# Patient Record
Sex: FEMALE | Race: WHITE | NOT HISPANIC OR LATINO | Employment: FULL TIME | URBAN - METROPOLITAN AREA
[De-identification: names, ages, dates, MRNs, and addresses within clinical notes are randomized per-mention and may not be internally consistent; named-entity substitution may affect disease eponyms.]

---

## 2017-04-26 ENCOUNTER — OFFICE VISIT (OUTPATIENT)
Dept: FAMILY MEDICINE | Facility: CLINIC | Age: 52
End: 2017-04-26
Payer: COMMERCIAL

## 2017-04-26 VITALS
OXYGEN SATURATION: 97 % | HEIGHT: 68 IN | HEART RATE: 90 BPM | WEIGHT: 178.75 LBS | DIASTOLIC BLOOD PRESSURE: 84 MMHG | RESPIRATION RATE: 18 BRPM | BODY MASS INDEX: 27.09 KG/M2 | SYSTOLIC BLOOD PRESSURE: 124 MMHG | TEMPERATURE: 97.8 F

## 2017-04-26 DIAGNOSIS — Z12.4 PAP SMEAR FOR CERVICAL CANCER SCREENING: ICD-10-CM

## 2017-04-26 DIAGNOSIS — Z13.6 CARDIOVASCULAR SCREENING; LDL GOAL LESS THAN 160: ICD-10-CM

## 2017-04-26 DIAGNOSIS — Z72.0 TOBACCO ABUSE: ICD-10-CM

## 2017-04-26 DIAGNOSIS — Z11.4 ENCOUNTER FOR SCREENING FOR HIV: ICD-10-CM

## 2017-04-26 DIAGNOSIS — Z12.39 SCREENING FOR MALIGNANT NEOPLASM OF BREAST: ICD-10-CM

## 2017-04-26 DIAGNOSIS — K59.00 CONSTIPATION, UNSPECIFIED CONSTIPATION TYPE: ICD-10-CM

## 2017-04-26 DIAGNOSIS — N95.1 MENOPAUSAL SYNDROME (HOT FLASHES): ICD-10-CM

## 2017-04-26 DIAGNOSIS — Z00.00 ENCOUNTER FOR ROUTINE ADULT HEALTH EXAMINATION WITHOUT ABNORMAL FINDINGS: Primary | ICD-10-CM

## 2017-04-26 DIAGNOSIS — Z12.11 SPECIAL SCREENING FOR MALIGNANT NEOPLASMS, COLON: ICD-10-CM

## 2017-04-26 DIAGNOSIS — K21.9 GASTROESOPHAGEAL REFLUX DISEASE, ESOPHAGITIS PRESENCE NOT SPECIFIED: ICD-10-CM

## 2017-04-26 DIAGNOSIS — Z23 ENCOUNTER FOR IMMUNIZATION: ICD-10-CM

## 2017-04-26 LAB
CHOLEST SERPL-MCNC: 234 MG/DL
HDLC SERPL-MCNC: 55 MG/DL
LDLC SERPL CALC-MCNC: 134 MG/DL
NONHDLC SERPL-MCNC: 179 MG/DL
TRIGL SERPL-MCNC: 227 MG/DL
TSH SERPL DL<=0.005 MIU/L-ACNC: 1.89 MU/L (ref 0.4–4)

## 2017-04-26 PROCEDURE — 86803 HEPATITIS C AB TEST: CPT | Performed by: FAMILY MEDICINE

## 2017-04-26 PROCEDURE — 87389 HIV-1 AG W/HIV-1&-2 AB AG IA: CPT | Performed by: FAMILY MEDICINE

## 2017-04-26 PROCEDURE — G0145 SCR C/V CYTO,THINLAYER,RESCR: HCPCS | Performed by: FAMILY MEDICINE

## 2017-04-26 PROCEDURE — 36415 COLL VENOUS BLD VENIPUNCTURE: CPT | Performed by: FAMILY MEDICINE

## 2017-04-26 PROCEDURE — 87624 HPV HI-RISK TYP POOLED RSLT: CPT | Performed by: FAMILY MEDICINE

## 2017-04-26 PROCEDURE — 90471 IMMUNIZATION ADMIN: CPT | Performed by: FAMILY MEDICINE

## 2017-04-26 PROCEDURE — 80061 LIPID PANEL: CPT | Performed by: FAMILY MEDICINE

## 2017-04-26 PROCEDURE — 84443 ASSAY THYROID STIM HORMONE: CPT | Performed by: FAMILY MEDICINE

## 2017-04-26 PROCEDURE — 90715 TDAP VACCINE 7 YRS/> IM: CPT | Performed by: FAMILY MEDICINE

## 2017-04-26 PROCEDURE — 99396 PREV VISIT EST AGE 40-64: CPT | Mod: 25 | Performed by: FAMILY MEDICINE

## 2017-04-26 ASSESSMENT — PAIN SCALES - GENERAL: PAINLEVEL: NO PAIN (0)

## 2017-04-26 NOTE — LETTER
May 4, 2017    Richmond Montanez  [id number] [address] [place] [id number]    Dear Richmond,  We are happy to inform you that your PAP smear result from 4/26/17 is normal.  We are now able to do a follow up test on PAP smears. The DNA test is for HPV (Human Papilloma Virus). Cervical cancer is closely linked with certain types of HPV. Your result showed no evidence of high risk HPV.  Therefore we recommend you return in 5 years for your next pap smear and HPV test.  You will still need to return to the clinic every year for an annual exam and other preventive tests.  Please contact the clinic at [phone] with any questions.  Sincerely,    Fernando Fonseca MD/esau

## 2017-04-26 NOTE — NURSING NOTE
"Chief Complaint   Patient presents with     Physical       Initial /84  Pulse 90  Temp 97.8  F (36.6  C) (Temporal)  Resp 18  Ht 5' 7.7\" (1.72 m)  Wt 178 lb 12 oz (81.1 kg)  LMP 05/12/2014 (Approximate)  SpO2 97%  BMI 27.42 kg/m2 Estimated body mass index is 27.42 kg/(m^2) as calculated from the following:    Height as of this encounter: 5' 7.7\" (1.72 m).    Weight as of this encounter: 178 lb 12 oz (81.1 kg).  Medication Reconciliation: complete     Ana M Braun CMA      "

## 2017-04-26 NOTE — PROGRESS NOTES
SUBJECTIVE:     CC: Richmond Montanez is an 51 year old woman who presents for preventive health visit.     Healthy Habits:    Do you get at least three servings of calcium containing foods daily (dairy, green leafy vegetables, etc.)? 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 no    Do you see a dentist twice per year? no    Do you have sleep apnea, excessive snoring or daytime drowsiness?no    Today's PHQ-2 Score:   PHQ-2 ( 1999 [employer]) 4/26/2017   Q1: Little interest or pleasure in doing things 1   Q2: Feeling down, depressed or hopeless 1   PHQ-2 Score 2     Abuse: Current or Past(Physical, Sexual or Emotional)- No  Do you feel safe in your environment - Yes    Social History   Substance Use Topics     Smoking status: Current Every Day Smoker     Packs/day: 1.00     Years: 15.00     Smokeless tobacco: Not on file     Alcohol use No      Comment: occasional     The patient does not drink >3 drinks per day nor >7 drinks per week.    No results for input(s): CHOL, HDL, LDL, TRIG, CHOLHDLRATIO, NHDL in the last 41409 hours.    Reviewed orders with patient.  Reviewed health maintenance and updated orders accordingly - Yes    Mammo Decision Support:  Patient over age 50, mutual decision to screen reflected in health maintenance.    Pertinent mammograms are reviewed under the imaging tab.  History of abnormal Pap smear:   NO - age 30-65 PAP every 5 years with negative HPV co-testing recommended  Last 3 Pap Results:   PAP (no units)   Date Value   09/04/2007 OTHER-NIL EM>40     Reviewed and updated as needed this visit by clinical staff  Tobacco  Allergies  Meds       Reviewed and updated as needed this visit by Provider        Past Medical History:   Diagnosis Date     Female infertility of unspecified origin      "  Past Surgical History:   Procedure Laterality Date      REPAIR OF NASAL SEPTUM  04/22/08    Septoplasty. Resect inferior turbinates, Partial uvulectomy.     ROS:  C: NEGATIVE for fever, chills, change in weight. POSITIVE for hotflashes.  I: NEGATIVE for worrisome rashes, moles or lesions  E: NEGATIVE for vision changes or irritation  ENT: NEGATIVE for ear, mouth and throat problems  R: NEGATIVE for significant cough or SOB  BREAST: POSITIVE for bilateral breast tenderness  CV: NEGATIVE for chest pain, palpitations or peripheral edema  GI: NEGATIVE for nausea or abdominal pain. POSITIVE for occasional heartburn/reflux and constipation with hard stools 1x/week.  : NEGATIVE for unusual urinary or vaginal symptoms. No vaginal bleeding. POSITIVE for vaginal dryness, occasional stress urinary incontinence.  M: NEGATIVE for significant arthralgias or myalgia  N: NEGATIVE for weakness, dizziness or paresthesias  P: NEGATIVE for changes in mood or affect     Problem list, Medication list, Allergies, and Medical/Social/Surgical histories reviewed in [facility] and updated as appropriate.  Patient Active Problem List    Diagnosis Date Noted     Deviated nasal septum 04/17/2008     Dyspnea and respiratory abnormality 04/17/2008     Problem list name updated by automated process. Provider to review       Personal history of tobacco use, presenting hazards to health 04/15/2002     Female infertility      Problem list name updated by automated process. Provider to review       OBJECTIVE:     /84  Pulse 90  Temp 97.8  F (36.6  C) (Temporal)  Resp 18  Ht 5' 7.7\" (1.72 m)  Wt 178 lb 12 oz (81.1 kg)  LMP 05/12/2014 (Approximate)  SpO2 97%  BMI 27.42 kg/m2  EXAM:  GENERAL APPEARANCE: Well-appearing female, slightly overweight, comfortable, alert and no distress  EYES: Eyes grossly normal to inspection, PERRL and conjunctivae and sclerae normal. Fundi benign.  HENT: Ear canals and TM's normal, nose and mouth without ulcers " or lesions, oropharynx clear and oral mucous membranes moist  NECK: No adenopathy, no asymmetry, masses, or scars and thyroid normal to palpation  RESP: Lungs clear to auscultation - no crackles or wheezes  BREAST: Normal without masses, tenderness or nipple discharge and no palpable axillary masses or adenopathy  CV: Regular rate and rhythm, normal S1 S2, no S3 or S4, no murmur, click or rub, no peripheral edema   ABDOMEN: Soft, nontender, no hepatosplenomegaly, no masses    (female): Normal female external genitalia, normal urethral meatus, slight vaginal mucosal atrophy noted, normal cervix without masses or abnormal discharge  MS: No musculoskeletal defects are noted   SKIN: No suspicious lesions or rashes  NEURO: Normal strength and tone, sensory exam grossly normal, mentation intact and speech normal  PSYCH: Mentation appears normal and affect normal/bright    Data:  Labs pending    ASSESSMENT/PLAdN:     (Z00.00) Encounter for routine adult health examination without abnormal findings  (primary encounter diagnosis)  Comment: Richmond Montanez is a 51 year old female presenting for routine physical. Patient has had limited medical contact with office visit in 2009. She has been doing well other than her menopausal symptoms and constipation. She also expresses desire to quit smoking today. On exam, patient vital signs are normal with slight prehypertensive reading. Patient was well-appearing with normal physical exam.  Plan: Pap imaged thin layer screen with HPV -         recommended age 30 - 65 years (select HPV order        below), HPV High Risk Types DNA Cervical, Lipid        Profile with reflex to direct LDL, Hepatitis C         Antibody  - See below for problem-specific plan    (Z12.4) Pap smear for cervical cancer screening  Comment: Patient due for routine pap smear/cervical cancer screen. No history of abnormal paps.  Plan:  - Pap and HPV co-test pending, rescreen in 5 years if negative    (Z12.11)  Special screening for malignant neoplasms, colon  Comment: Patient due for routine colonoscopy.  Plan: GASTROENTEROLOGY ADULT REF PROCEDURE ONLY,         CANCELED: GASTROENTEROLOGY ADULT REF PROCEDURE         ONLY          (Z12.39) Screening for malignant neoplasm of breast  Comment: Patient reported some bilateral breast tenderness but breast exam was benign. Due for routine screening mammogram.  Plan: *MA Screening Digital Bilateral    (K59.00) Constipation, unspecified constipation type  Comment: Richmond reports constipation with hard stools only 1x/week requiring straining. This has been going on for several years at least. She does have mother with history of hypothyroidism. Will test TSH to evaluate for hypothyroidism, also discussed dietary changes to help normalize bowel movements and discussed use of OTC Miralax to help soften stools.  Plan: TSH with free T4 reflex  - TSH pending  - Recommend use of Miralax 1 capful BID for 1 week, adjusting as necessary to achieve 1 soft stool every 1-2 days    (Z13.6) CARDIOVASCULAR SCREENING; LDL GOAL LESS THAN 160  Comment: Patient due for routine lipid screen.  Plan:   - Lipids pending  - Will assess patient's ASCVD risk factor pending lipid results    (Z11.4) Encounter for screening for HIV  Comment: One-time screen for HIV.  Plan: HIV Antigen Antibody Combo    (Z72.0) Tobacco abuse  Comment: Richmond expressed interest in tobacco cessation today. We discussed options of varenicline, bupropion, nicotine replacement options and counseling/quitplan resources. Patient was interested in trying varenicline. Encouraged her to also utilize the quitplan resources as combination medication and counseling significantly increases quit success rates.  Plan: varenicline (CHANTIX STARTING MONTH PAK) 0.5 MG        X 11 & 1 MG X 42 tablet  - Set quit date. Initiate varenicline 2 weeks prior to quit date and take per package instructions. 2 weeks following medication initiation, encouraged  patient to get rid of all cigarettes and ashtrays.  - Quitplan resource pamphlet provided to patient so that she may take advantage of their phone counseling and texting options if she desires.    (Z23) Encounter for immunization  Comment: Patient due for TDAP booster.  Plan: TDAP VACCINE (ADACEL), CANCELED: TDAP VACCINE         (BOOSTRIX)  - TDAP given today  - Given smoking history, patient is also a candidate for the Pneumovax vaccine    (K21.9) Gastroesophageal reflux disease, esophagitis presence not specified  Comment: Richmond reports excessive TUMS use at least daily and persistent symptoms of reflux and occasional heartburn. Suspect symptoms due to GERD. Low suspicion for PUD given low risk with limited ibuprofen use, though patient's smoking would put her at increased risk for ulcer development.   Plan: ranitidine (ZANTAC) 300 MG tablet  - Trial of ranitidine 300 mg daily for 6-8 weeks, if symptoms not improved, patient should undergo EGD for further evaluation    (N95.1) Menopausal syndrome (hot flashes)  Comment: LMP of 8/11/15. Patient reports very bothersome hotflashes both during the day and nightly.Also with noticeable vaginal dryness and low libido. She is interested in hormone replacement therapy. Discussed that estrogen replacement can be used safely for the first 10 years post-menopause though often only is needed for the first 2-3 years to control hot flash symptoms.   Plan: estrogen, conjugated,-medroxyPROGESTERone         (PREMPRO) 0.3-1.5 MG per tablet  - Take Prempro daily as prescribed, recheck symptoms in 1 year  - Will aim to wean off in 2-3 years and if vaginal dryness remains bothersome can consider estrogen cream in the future    COUNSELING:   Reviewed preventive health counseling, as reflected in patient instructions  Special attention given to:        Regular exercise       Healthy diet/nutrition       Vision screening       Vaccinated for: TDAP       Colon cancer screening        "Consider Hep C screening for patients born between 1945 and 1965       HIV screeninx in teen years, 1x in adult years, and at intervals if high risk       ([place])menopause management       Consider lung cancer screening for ages 55-80 years and 30 pack-year smoking history     BP Screening:   Last 3 BP Readings:    BP Readings from Last 3 Encounters:   17 124/84   14 120/78   09 122/72     The following was recommended to the patient:  Re-screen BP within a year and recommended lifestyle modifications     reports that she has been smoking.  She has a 15.00 pack-year smoking history. She does not have any smokeless tobacco history on file.  Tobacco Cessation Action Plan: Pharmacotherapies : Chantix  and Quitplan Pamphlet given  Estimated body mass index is 27.42 kg/(m^2) as calculated from the following:    Height as of this encounter: 5' 7.7\" (1.72 m).    Weight as of this encounter: 178 lb 12 oz (81.1 kg).   Weight management plan: Discussed healthy diet and exercise guidelines and patient will follow up in 12 months in clinic to re-evaluate.    Counseling Resources:  ATP IV Guidelines  Pooled Cohorts Equation Calculator  Breast Cancer Risk Calculator  FRAX Risk Assessment  ICSI Preventive Guidelines  Dietary Guidelines for Americans, 2010  USDA's MyPlate  ASA Prophylaxis  Lung CA Screening    Patient was seen and examined by myself under Dr. Fonseca's supervision.  The note was then scribed by me.    Yue Tamez, MS3    This patient was seen and examined by myself as well as the medical student.  The medical student scribed the note and I have reviewed it, edited it appropriately, and agree with the final documentation.     Electronically signed by:  Fernando Fonseca M.D.  2017       "

## 2017-04-26 NOTE — NURSING NOTE
Prior to injection verified patient identity using patient's name and date of birth. Instucted to wait for 20minutes after injection.  Sydney SHEARER MA

## 2017-04-27 ENCOUNTER — TELEPHONE (OUTPATIENT)
Dept: FAMILY MEDICINE | Facility: CLINIC | Age: 52
End: 2017-04-27

## 2017-04-27 LAB
HCV AB SERPL QL IA: NORMAL
HIV 1+2 AB+HIV1 P24 AG SERPL QL IA: NORMAL

## 2017-05-01 LAB
COPATH REPORT: NORMAL
PAP: NORMAL

## 2017-05-02 LAB
FINAL DIAGNOSIS: NORMAL
HPV HR 12 DNA CVX QL NAA+PROBE: NEGATIVE
HPV16 DNA SPEC QL NAA+PROBE: NEGATIVE
HPV18 DNA SPEC QL NAA+PROBE: NEGATIVE
SPECIMEN DESCRIPTION: NORMAL

## 2017-07-11 ENCOUNTER — HOSPITAL ENCOUNTER (OUTPATIENT)
Dept: MAMMOGRAPHY | Facility: CLINIC | Age: 52
Discharge: HOME | End: 2017-07-11
Attending: FAMILY MEDICINE | Admitting: FAMILY MEDICINE
Payer: COMMERCIAL

## 2017-07-11 DIAGNOSIS — Z12.31 VISIT FOR SCREENING MAMMOGRAM: ICD-10-CM

## 2017-07-11 PROCEDURE — G0202 SCR MAMMO BI INCL CAD: HCPCS

## 2018-03-08 ENCOUNTER — TELEPHONE (OUTPATIENT)
Dept: FAMILY MEDICINE | Facility: CLINIC | Age: 53
End: 2018-03-08

## 2018-03-08 NOTE — TELEPHONE ENCOUNTER
Patient Contact    Attempt # 1    Was call answered?  No.  Unable to leave message. Unidentified answering machine..........TIP Mattson

## 2018-03-08 NOTE — TELEPHONE ENCOUNTER
If you get a hold of her, she will either need to see another provider or be seen in the ED.    Electronically signed by:  Fernando Fonseca M.D.  3/8/2018

## 2018-03-08 NOTE — TELEPHONE ENCOUNTER
: 1965  PHONE #'s: [phone] (home) none (work)    PRESENTING PROBLEM:  Back pain from injury. Slipped outside on steps and landed on her back on wooden steps.     NURSING ASSESSMENT  Description:  C/O one spot she can't even touch and gets back back spasms. Hurts to sit on the toilet or sitting down.   Onset/duration:  18  Precip. factors:   As above   Assoc. Sx:  Is able to walk OK.   Improves/worsens Sx:  same  Pain scale (1-10)   9/10 when gets the stabbing pains when she turns her upper body or swivel at waist a little bit.  3/10/ when sits or stands still.   Sx specific meds:  ADVIL PM at HS. And Advil during the day 600 mg.   LMP/preg/breast feeding:  NA  Last exam/Tx:  Has NOT been seen for this.     RECOMMENDED DISPOSITION:  See in 24 hours - Scheduled filiberto with Dr Terrell tomorrow at 8: 20 AM.  Home Care Instructions for Back Pain.  *  Light activities for 2 to 3 days  * Use a firm mattress or place a board under a softer one to sleep one.  *  Avoid activities such as prolongs sitting, Lifting, or jumping until pain is resolved. *do NOT stay in bed.- will get stiff.)  *  Take usual pain medication for discomfort and follow instructions on the label. Do not give aspirin  to a child. Do not take Tylenol if liver disease is present.  * If pain related to an injury, apply ice packs for the first 24 hours, 20 minutes on 2 hours off every 2 hours while awake.  * Use moist heat (Shower, tub, moist hot towels) for 20 to 30 minutes every 2 hours while awake for the next 48 hours.  * Sleep in fetal position or on back with one to two pillows under knees to help reduce discomfort.     If no improvement in 3 days, pain worsens, pain radiates into any limb, painful urination frequency, fever or blood in urine then needs to be seen.    Will comply with recommendation: YES   If further questions/concerns or if Sx do not improve, worsen or new Sx develop, call your PCP or [facility] as soon as  possible.    NOTES:  Disposition was determined by the first positive assessment question, therefore all previous assessment questions were negative.  Informed to check provider manual or call insurance company to assure coverage.    Guideline used: Back Pain  Telephone Triage Protocols for Nurses, Fifth Edition, Virginie De Oliveira RN

## 2018-03-08 NOTE — TELEPHONE ENCOUNTER
Reason for Call:  Same Day Appointment, Requested Provider:  Fernando Fonseca M.D.    PCP: Fernando Fonseca    Reason for visit: back pain after falling on steps and step going into her back    Duration of symptoms: Fell on Tuesday    Have you been treated for this in the past? No    Additional comments: Pt wondering if you can work her in today asap? Possibly this afternoon. Please call back and advise.     Can we leave a detailed message on this number? YES    Phone number patient can be reached at: Home number on file [phone] (home)    Best Time: anytime    Call taken on 3/8/2018 at 10:41 AM by Nivia Bradford

## 2018-03-09 ENCOUNTER — OFFICE VISIT (OUTPATIENT)
Dept: FAMILY MEDICINE | Facility: OTHER | Age: 53
End: 2018-03-09
Payer: COMMERCIAL

## 2018-03-09 VITALS
BODY MASS INDEX: 26.37 KG/M2 | DIASTOLIC BLOOD PRESSURE: 70 MMHG | SYSTOLIC BLOOD PRESSURE: 122 MMHG | WEIGHT: 174 LBS | TEMPERATURE: 97.4 F | OXYGEN SATURATION: 99 % | HEART RATE: 80 BPM | HEIGHT: 68 IN

## 2018-03-09 DIAGNOSIS — R10.9 FLANK PAIN: Primary | ICD-10-CM

## 2018-03-09 PROCEDURE — 99213 OFFICE O/P EST LOW 20 MIN: CPT | Performed by: INTERNAL MEDICINE

## 2018-03-09 RX ORDER — ETODOLAC 400 MG
400 TABLET ORAL 2 TIMES DAILY
Qty: 21 TABLET | Refills: 0 | Status: SHIPPED | OUTPATIENT
Start: 2018-03-09 | End: 2019-01-10

## 2018-03-09 NOTE — PROGRESS NOTES
Chief Complaint   Patient presents with     Fall     3/5/18 on ice- hit back steps     Back Pain     Chief Complaint:  Richmond Montanez is a 52 year old female who presents today for evaluation of back pain. According to the patient, on March 5th she was walking outside and going down her wooden patio stairs when she slipped on the ice, fell and impacted her back on the step. Since this time, she has been experiencing left lower lumbar pain described as a stabbing sensation that is exacerbated with coughing or any movement or twisting. She has been managing her pain with Ibuprofen with only minimal relief. She denies any radiculopathy, numbness or tingling in her lower extremities. Bowel and bladder habits have been at baseline including no hematuria. She denies any other injuries and did not hit her head or lose consciousness.     Allergies:  Allergies   Allergen Reactions     Penicillins      Sulfa Drugs        Medical History:   Past Medical History:   Diagnosis Date     Female infertility of unspecified origin        Current Medications:   Current Outpatient Prescriptions   Medication Sig Dispense Refill     estrogen, conjugated,-medroxyPROGESTERone (PREMPRO) 0.3-1.5 MG per tablet Take 1 tablet by mouth daily 90 tablet 3     varenicline (CHANTIX STARTING MONTH PAK) 0.5 MG X 11 & 1 MG X 42 tablet Take 0.5 mg tab daily for 3 days, then 0.5 mg tab twice daily for 4 days, then 1 mg twice daily. (Patient not taking: Reported on 3/9/2018) 53 tablet 0       Surgical History:  Past Surgical History:   Procedure Laterality Date     HC REPAIR OF NASAL SEPTUM  04/22/08    Septoplasty. Resect inferior turbinates, Partial uvulectomy.       Social/Family History:  Social History   Substance Use Topics     Smoking status: Current Every Day Smoker     Packs/day: 1.00     Years: 15.00     Smokeless tobacco: Never Used     Alcohol use Yes      Comment: once per year     Family History   Problem Relation Age of Onset     Arthritis  "Mother      DIABETES Mother      insulin type II     Thyroid Disease Mother      hypo     EYE* Mother      glaucoma     Lung Cancer Mother      d/c 78     CANCER Father      Cancer from asbestos exposure 1999 (Mesothelioma)     Hypertension Father      KIDNEY DISEASE Sister      Kidney transplant 2/2 kidney failure due to NSAID use     Alcohol/Drug No family hx of      Allergies No family hx of      Alzheimer Disease No family hx of      Anesthesia Reaction No family hx of      Blood Disease No family hx of      Depression No family hx of      Genetic Disorder No family hx of      GASTROINTESTINAL DISEASE No family hx of      Genitourinary Problems No family hx of      Gynecology No family hx of      HEART DISEASE No family hx of      Lipids No family hx of      Neurologic Disorder No family hx of      OSTEOPOROSIS No family hx of      Psychotic Disorder No family hx of      Respiratory No family hx of      CEREBROVASCULAR DISEASE No family hx of      Obesity No family hx of        ROS:.    HEART: Pt denies: chest pain, arrythmia, syncope, tachy or bradyarrhythmia or excess edema.   LUNGS: Pt denies: shortness of breath.   GI: Pt denies: nausea, vomitting, diarrhea, constipation, melena, or hematochezia.   MS: Pt denies: any other significant joint pain, swelling, or acute loss of function. Able to ambulate with little or no assistance.   NEURO: weakness, paraesthesias, or paralysis.    Physical Exam:  /70 (BP Location: Left arm, Patient Position: Chair, Cuff Size: Adult Regular)  Pulse 80  Temp 97.4  F (36.3  C) (Tympanic)  Ht 5' 8\" (1.727 m)  Wt 174 lb (78.9 kg)  LMP 05/12/2014 (Approximate)  SpO2 99%  BMI 26.46 kg/m2     Constitutional: The patient has no signs of trauma and appears to be in no acute distress. The patient appears to be adequately hydrated. No acute respiratory or hemodynamic distress is noted at this time.   LUNGS: clear bilaterally, airflow is brisk, no intercostal retraction or " stridor is noted.    HEART:  regular without rubs, clicks, gallops, or murmurs. Upstrokes are brisk. S1,S2 are heard.   GI: Abdomen is soft, without rebound, guarding or tenderness. No signs of trauma.    MS: Back with point tenderness to palpation over the left para-lumbar musculature. No evidence of trauma including ecchymosis or abrasions. No midline spinal tenderness to palpation. No obvious deformity is present. Muscle strength is appropriate and equal bilaterally in the lower extremities. No acute joint erythema or swelling is present.   NEURO: Pt is alert and appropriate. Peripheral sensory and motor function are grossly normal    1. Flank pain  I do not feel imaging is indicated at this time given there is no ecchymosis or other signs of trauma, and no midline or bony tenderness. I did provide a prescription for Etodolac to help with the pain. She was advised to rest and use a heating pad for the next few days. If her symptoms are not improving in the next week or she develops any new or worsening symptoms, she was advised to return for re-evaluation.  - etodolac (LODINE) 400 MG tablet; Take 1 tablet (400 mg) by mouth 2 times daily  Dispense: 21 tablet; Refill: 0    Follow up:  I have asked the patient to schedule a follow up appointment with me in 1-2 weeks, or sooner if conditions fail to improve as expected.      I have carefully explained the diagnosis and treatment options with the patient. The patient has displayed an understanding of the above, and all subsequent questions were answered.      This patient has been interviewed, examined, diagnosed, and informed of the above by me personally.  Medical records and available pertinent information has been reviewed by me personally.  All decisions and discussion have been between myself and the patient/family.  This was done in the presence of Vasquez Elizalde  , who acted as a medical scribe and recorded the events above.  No diagnosis or decision making  was made by the above-mentioned scribe.  The patient, and or his/her ensurors will not be billed for the presence or actions of this scribe.  The information recorded by the scribe has been reviewed by me and found to be accurate.                    DO KETAN Kinsey    Portions of this note were produced using [employer]  Although every attempt at real-time proof reading has been made, occasional grammar/syntax errors may have been missed.

## 2018-03-09 NOTE — MR AVS SNAPSHOT
"              After Visit Summary   3/9/2018    Richmond Montanez    MRN: [id number]           Patient Information     Date Of Birth          1965        Visit Information        Provider Department      3/9/2018 8:20 AM Amadou Terrell DO [facility]        Today's Diagnoses     Flank pain    -  1       Follow-ups after your visit        Follow-up notes from your care team     Return in about 2 weeks (around 3/23/2018).      Who to contact     If you have questions or need follow up information about today's clinic visit or your schedule please contact [facility] directly at [phone].  Normal or non-critical lab and imaging results will be communicated to you by [employer], letter or phone within 4 business days after the clinic has received the results. If you do not hear from us within 7 days, please contact the clinic through [employer] or phone. If you have a critical or abnormal lab result, we will notify you by phone as soon as possible.  Submit refill requests through [employer] or call your pharmacy and they will forward the refill request to us. Please allow 3 business days for your refill to be completed.          Additional Information About Your Visit        MyChart Information     [employer] lets you send messages to your doctor, view your test results, renew your prescriptions, schedule appointments and more. To sign up, go to [email or web address] . Click on \"Log in\" on the left side of the screen, which will take you to the Welcome page. Then click on \"Sign up Now\" on the right side of the page.     You will be asked to enter the access code listed below, as well as some personal information. Please follow the directions to create your username and password.     Your access code is: [email or web address]  Expires: 2018  9:49 AM     Your access code will  in 90 days. If you need help or a new code, please call your [facility] or [phone].        Care " "EveryWhere ID     This is your Care EveryWhere ID. This could be used by other organizations to access your [facility] medical records  [id number]        Your Vitals Were     Pulse Temperature Height Last Period Pulse Oximetry BMI (Body Mass Index)    80 97.4  F (36.3  C) (Tympanic) 5' 8\" (1.727 m) 05/12/2014 (Approximate) 99% 26.46 kg/m2       Blood Pressure from Last 3 Encounters:   03/09/18 122/70   04/26/17 124/84   05/26/14 120/78    Weight from Last 3 Encounters:   03/09/18 174 lb (78.9 kg)   04/26/17 178 lb 12 oz (81.1 kg)   05/26/14 150 lb (68 kg)              Today, you had the following     No orders found for display         Today's Medication Changes          These changes are accurate as of 3/9/18 11:59 PM.  If you have any questions, ask your nurse or doctor.               Start taking these medicines.        Dose/Directions    etodolac 400 MG tablet   Commonly known as:  LODINE   Used for:  Flank pain   Started by:  Amadou Terrell DO        Dose:  400 mg   Take 1 tablet (400 mg) by mouth 2 times daily   Quantity:  21 tablet   Refills:  0            Where to get your medicines      These medications were sent to [facility] - [place], [place] - [address] [facility]  [address] [facility] [place] [id number]     Phone:  [phone]     etodolac 400 MG tablet                Primary Care Provider Office Phone # Fax #    Fernando Fonseca [phone]       [address] [place] [address]        Equal Access to Services     [facility] AH: Hadii leidy farrell hadasho Soluis angel, waaxda luqadaha, qaybta kaalmada alok, clinton pickard. So [facility] [phone].    ATENCIÓN: Si habla español, tiene a perez disposición servicios gratuitos de asistencia lingüística. Llame al [phone].    We comply with applicable federal civil rights laws and [place] laws. We do not discriminate on the basis of race, color, national origin, age, disability, sex, sexual orientation, or " gender identity.            Thank you!     Thank you for choosing [facility]  for your care. Our goal is always to provide you with excellent care. Hearing back from our patients is one way we can continue to improve our services. Please take a few minutes to complete the written survey that you may receive in the mail after your visit with us. Thank you!             Your Updated Medication List - Protect others around you: Learn how to safely use, store and throw away your medicines at www.disposemymeds.org.          This list is accurate as of 3/9/18 11:59 PM.  Always use your most recent med list.                   Brand Name Dispense Instructions for use Diagnosis    estrogen (conjugated)-medroxyPROGESTERone 0.3-1.5 MG per tablet    PREMPRO    90 tablet    Take 1 tablet by mouth daily    Menopausal syndrome (hot flashes)       etodolac 400 MG tablet    LODINE    21 tablet    Take 1 tablet (400 mg) by mouth 2 times daily    Flank pain       varenicline 0.5 MG X 11 & 1 MG X 42 tablet    CHANTIX STARTING MONTH ZULEMA    53 tablet    Take 0.5 mg tab daily for 3 days, then 0.5 mg tab twice daily for 4 days, then 1 mg twice daily.    Tobacco abuse

## 2018-08-02 ENCOUNTER — TELEPHONE (OUTPATIENT)
Dept: FAMILY MEDICINE | Facility: OTHER | Age: 53
End: 2018-08-02

## 2018-08-02 NOTE — TELEPHONE ENCOUNTER
Panel Management Review      Patient has the following on her problem list: None      Composite cancer screening  Chart review shows that this patient is due/due soon for the following Colonoscopy  Summary:    Patient is due/failing the following:   COLONOSCOPY    Action needed:   Patient needs referral/order: colonoscopy    Type of outreach:    Sent letter.    Questions for provider review:    None                                                                                                                                    Bryanna AVILA       Chart routed to Care Team .

## 2018-08-02 NOTE — LETTER
[facility]  [address] [facility] [address] [phone]        Richmond Montanez  [address] [address] [place] [address]      August 2, 2018      Dear Richmond,    I care about your health and have reviewed your health plan, including your medical conditions, medication list, and lab results and am making recommendations based on this review, to better manage your health.    You are in particular need of attention regarding:  -Colon Cancer Screening    I am recommending that you:  -schedule a COLONOSCOPY.  Colon cancer is now the second leading cause of cancer-related deaths in the [place] for both men and women.  There are over 130,000 new cases and 50,000 deaths per year from colon cancer.  A recent study, which included patients ages 55 to 79 found 50,400 American deaths from colorectal cancer could have been prevented if patients had undergone a colonoscopy in the previous 10 years.    If you have not had a colonoscopy, we encourage you to schedule by contacting us at [phone], Monday through Friday.  After hours, you may leave a message and we will return your call during normal business hours.      There is another option called a FIT test, if you don t wish to have a colonoscopy, which needs to be repeated every year.  It does replace the colonoscopy for colorectal cancer screening and can detect hidden bleeding in the lower colon.  If a positive result is obtained, you would be referred for a colonoscopy. Please discuss this option with your provider.      For patients under/uninsured, we recommend you contact the [employer] program. [employer] is a free colorectal cancer screening program that provides colonoscopies for eligible under/uninsured [place] men and women. If you are interested in receiving a free colonoscopy, please call [employer] at [phone] (mention code [employer]Web) to see if you re eligible.     If you've had the preventative screening  completed at another facility or feel you're not due for this screening, please call our clinic at the number listed above or send us a My Chart message so we can update our records. We would like to thank you in advance for taking the time to take care of your health.  If you have any questions, please don t hesitate to contact our clinic.    Sincerely,       Your [facility] Team

## 2019-01-08 ENCOUNTER — HOSPITAL ENCOUNTER (EMERGENCY)
Facility: CLINIC | Age: 54
Discharge: HOME | End: 2019-01-08
Attending: EMERGENCY MEDICINE | Admitting: EMERGENCY MEDICINE
Payer: COMMERCIAL

## 2019-01-08 ENCOUNTER — APPOINTMENT (OUTPATIENT)
Dept: GENERAL RADIOLOGY | Facility: CLINIC | Age: 54
End: 2019-01-08
Payer: COMMERCIAL

## 2019-01-08 VITALS
DIASTOLIC BLOOD PRESSURE: 70 MMHG | OXYGEN SATURATION: 95 % | RESPIRATION RATE: 12 BRPM | SYSTOLIC BLOOD PRESSURE: 112 MMHG | TEMPERATURE: 97.6 F | HEART RATE: 81 BPM

## 2019-01-08 DIAGNOSIS — Z63.4 DEATH OF HUSBAND: ICD-10-CM

## 2019-01-08 DIAGNOSIS — R07.89 CHEST TIGHTNESS: ICD-10-CM

## 2019-01-08 LAB
ALBUMIN SERPL-MCNC: 3.5 G/DL (ref 3.4–5)
ALP SERPL-CCNC: 82 U/L (ref 40–150)
ALT SERPL W P-5'-P-CCNC: 22 U/L (ref 0–50)
ANION GAP SERPL CALCULATED.3IONS-SCNC: 7 MMOL/L (ref 3–14)
AST SERPL W P-5'-P-CCNC: 11 U/L (ref 0–45)
BASOPHILS # BLD AUTO: 0.1 10E9/L (ref 0–0.2)
BASOPHILS NFR BLD AUTO: 0.5 %
BILIRUB SERPL-MCNC: 0.2 MG/DL (ref 0.2–1.3)
BUN SERPL-MCNC: 16 MG/DL (ref 7–30)
CALCIUM SERPL-MCNC: 8.5 MG/DL (ref 8.5–10.1)
CHLORIDE SERPL-SCNC: 112 MMOL/L (ref 94–109)
CO2 SERPL-SCNC: 26 MMOL/L (ref 20–32)
CREAT SERPL-MCNC: 0.74 MG/DL (ref 0.52–1.04)
D DIMER PPP FEU-MCNC: 0.3 UG/ML FEU (ref 0–0.5)
DIFFERENTIAL METHOD BLD: NORMAL
EOSINOPHIL NFR BLD AUTO: 2.9 %
ERYTHROCYTE [DISTWIDTH] IN BLOOD BY AUTOMATED COUNT: 13 % (ref 10–15)
GFR SERPL CREATININE-BSD FRML MDRD: >90 ML/MIN/{1.73_M2}
GLUCOSE SERPL-MCNC: 100 MG/DL (ref 70–99)
HCT VFR BLD AUTO: 40.4 % (ref 35–47)
HGB BLD-MCNC: 13.7 G/DL (ref 11.7–15.7)
IMM GRANULOCYTES # BLD: 0 10E9/L (ref 0–0.4)
IMM GRANULOCYTES NFR BLD: 0.2 %
LIPASE SERPL-CCNC: 178 U/L (ref 73–393)
LYMPHOCYTES # BLD AUTO: 3.7 10E9/L (ref 0.8–5.3)
LYMPHOCYTES NFR BLD AUTO: 38.8 %
MCH RBC QN AUTO: 29.2 PG (ref 26.5–33)
MCHC RBC AUTO-ENTMCNC: 33.9 G/DL (ref 31.5–36.5)
MCV RBC AUTO: 86 FL (ref 78–100)
MONOCYTES # BLD AUTO: 0.5 10E9/L (ref 0–1.3)
MONOCYTES NFR BLD AUTO: 5.5 %
NEUTROPHILS # BLD AUTO: 4.9 10E9/L (ref 1.6–8.3)
NEUTROPHILS NFR BLD AUTO: 52.1 %
NRBC # BLD AUTO: 0 10*3/UL
NRBC BLD AUTO-RTO: 0 /100
PLATELET # BLD AUTO: 298 10E9/L (ref 150–450)
POTASSIUM SERPL-SCNC: 3.5 MMOL/L (ref 3.4–5.3)
PROT SERPL-MCNC: 6.7 G/DL (ref 6.8–8.8)
RBC # BLD AUTO: 4.69 10E12/L (ref 3.8–5.2)
SODIUM SERPL-SCNC: 145 MMOL/L (ref 133–144)
TROPONIN I SERPL-MCNC: <0.015 UG/L (ref 0–0.04)
WBC # BLD AUTO: 9.5 10E9/L (ref 4–11)

## 2019-01-08 PROCEDURE — 80053 COMPREHEN METABOLIC PANEL: CPT | Performed by: EMERGENCY MEDICINE

## 2019-01-08 PROCEDURE — 99285 EMERGENCY DEPT VISIT HI MDM: CPT | Mod: 25 | Performed by: EMERGENCY MEDICINE

## 2019-01-08 PROCEDURE — 85379 FIBRIN DEGRADATION QUANT: CPT | Performed by: EMERGENCY MEDICINE

## 2019-01-08 PROCEDURE — 93005 ELECTROCARDIOGRAM TRACING: CPT | Performed by: EMERGENCY MEDICINE

## 2019-01-08 PROCEDURE — 84484 ASSAY OF TROPONIN QUANT: CPT | Performed by: EMERGENCY MEDICINE

## 2019-01-08 PROCEDURE — 99284 EMERGENCY DEPT VISIT MOD MDM: CPT | Mod: Z6 | Performed by: EMERGENCY MEDICINE

## 2019-01-08 PROCEDURE — 85025 COMPLETE CBC W/AUTO DIFF WBC: CPT | Performed by: EMERGENCY MEDICINE

## 2019-01-08 PROCEDURE — 83690 ASSAY OF LIPASE: CPT | Performed by: EMERGENCY MEDICINE

## 2019-01-08 PROCEDURE — 25000132 ZZH RX MED GY IP 250 OP 250 PS 637: Performed by: EMERGENCY MEDICINE

## 2019-01-08 PROCEDURE — 71046 X-RAY EXAM CHEST 2 VIEWS: CPT | Mod: TC

## 2019-01-08 RX ORDER — LORAZEPAM 1 MG/1
.5-1 TABLET ORAL EVERY 6 HOURS PRN
Qty: 20 TABLET | Refills: 0 | Status: SHIPPED | OUTPATIENT
Start: 2019-01-08 | End: 2019-05-08

## 2019-01-08 RX ORDER — ASPIRIN 81 MG/1
324 TABLET, CHEWABLE ORAL ONCE
Status: COMPLETED | OUTPATIENT
Start: 2019-01-08 | End: 2019-01-08

## 2019-01-08 RX ADMIN — ASPIRIN 81 MG 324 MG: 81 TABLET ORAL at 20:37

## 2019-01-08 SDOH — SOCIAL STABILITY - SOCIAL INSECURITY: DISSAPEARANCE AND DEATH OF FAMILY MEMBER: Z63.4

## 2019-01-08 NOTE — ED AVS SNAPSHOT
[facility] Emergency Department  [address] [place] [address]  Phone:  [phone]  Fax:  [phone]                                    Richmond Montanez   MRN: [id number]    Department:  [facility] Emergency Department   Date of Visit:  1/8/2019           After Visit Summary Signature Page    I have received my discharge instructions, and my questions have been answered. I have discussed any challenges I see with this plan with the nurse or doctor.    ..........................................................................................................................................  Patient/Patient Representative Signature      ..........................................................................................................................................  Patient Representative Print Name and Relationship to Patient    ..................................................               ................................................  Date                                   Time    ..........................................................................................................................................  Reviewed by Signature/Title    ...................................................              ..............................................  Date                                               Time          22EPIC Rev 08/18

## 2019-01-09 NOTE — DISCHARGE INSTRUCTIONS
Drink plenty of fluids and try to get some rest.    We will try to help you get an appointment with Dr. Fonseca.    Lorazepam as needed for anxiety.    Return at any time for concerns or worsening.    I am so sorry for all that has happened in the last few weeks.  I hope that things start to improve for you soon!

## 2019-01-09 NOTE — ED TRIAGE NOTES
"Pt presents with concerns of chest tightness.  Pt states that she has had the chest tightness in the center of the chest for the last few days.  Pt states that she has had nausea for a while.  Denies shortness of breath.  Pt's   on  of \"a  maker\", he was buried today.    "

## 2019-01-09 NOTE — ED PROVIDER NOTES
"  History     Chief Complaint   Patient presents with     Chest Pain     HPI  History per patient    This is a 53-year-old female, otherwise healthy, presenting with chest pain.  Patient has had tightness in her substernal area across her mid chest for the last few days.  The pain comes and goes.  She will occasionally feel mild shortness of breath but nothing dramatic.  Patient smokes about 1/2 pack/day normally but has been smoking about a pack and 1/2/day recently.  She acknowledges a smoker's cough.  She has not had any fevers, chills, runny nose, sinus pain or pressure, sore throat.  She is chronically constipated and usually only has a bowel movement 1-2 times per week with associated severe cramping and nausea.  She still has her gallbladder.  She occasionally gets some pain just below her sternum.  On the way to the hospital, she had an episode of left upper back pain.  No new swelling or tenderness in her calves.  She does not smoke.      Of note, patient's    of a \" maker\".  She is very concerned about her heart and she is here with her daughter who is also concerned.  She has no history of coronary disease.  No history of early coronary disease in her family.  She has not been diagnosed with hypertension, hyperlipidemia, diabetes.    Problem List:    Patient Active Problem List    Diagnosis Date Noted     Deviated nasal septum 2008     Priority: Medium     Dyspnea and respiratory abnormality 2008     Priority: Medium     Problem list name updated by automated process. Provider to review       Personal history of tobacco use, presenting hazards to health 04/15/2002     Priority: Medium     Female infertility      Priority: Medium     Problem list name updated by automated process. Provider to review          Past Medical History:    Past Medical History:   Diagnosis Date     Female infertility of unspecified origin        Past Surgical History:    Past Surgical History: "   Procedure Laterality Date     HC REPAIR OF NASAL SEPTUM  04/22/08    Septoplasty. Resect inferior turbinates, Partial uvulectomy.       Family History:    Family History   Problem Relation Age of Onset     Arthritis Mother      Diabetes Mother         insulin type II     Thyroid Disease Mother         hypo     EYE* Mother         glaucoma     Lung Cancer Mother         d/c 78     Cancer Father         Cancer from asbestos exposure 1999 (Mesothelioma)     Hypertension Father      Kidney Disease Sister         Kidney transplant 2/2 kidney failure due to NSAID use     Alcohol/Drug No family hx of      Allergies No family hx of      Alzheimer Disease No family hx of      Anesthesia Reaction No family hx of      Blood Disease No family hx of      Depression No family hx of      Genetic Disorder No family hx of      Gastrointestinal Disease No family hx of      Genitourinary Problems No family hx of      Gynecology No family hx of      Heart Disease No family hx of      Lipids No family hx of      Neurologic Disorder No family hx of      Osteoporosis No family hx of      Psychotic Disorder No family hx of      Respiratory No family hx of      Cerebrovascular Disease No family hx of      Obesity No family hx of        Social History:  Marital Status:   [2]  Social History     Tobacco Use     Smoking status: Current Every Day Smoker     Packs/day: 1.00     Years: 15.00     Pack years: 15.00     Smokeless tobacco: Never Used   Substance Use Topics     Alcohol use: Yes     Comment: once per year     Drug use: No        Medications:      LORazepam (ATIVAN) 1 MG tablet   estrogen, conjugated,-medroxyPROGESTERone (PREMPRO) 0.3-1.5 MG per tablet   etodolac (LODINE) 400 MG tablet   varenicline (CHANTIX STARTING MONTH PAK) 0.5 MG X 11 & 1 MG X 42 tablet         Review of Systems   All other ROS reviewed and are negative or non-contributory except as stated in HPI.     Physical Exam   BP: 132/72  Pulse: 82  Heart Rate:  101  Temp: 97.6  F (36.4  C)  Resp: 17  SpO2: 97 %      Physical Exam   Constitutional: She appears well-developed and well-nourished.   Tearful, mildly anxious female sitting in the bed   HENT:   Head: Normocephalic.   Right Ear: External ear normal.   Left Ear: External ear normal.   Nose: Nose normal.   Tacky mucous membranes   Eyes: Conjunctivae and EOM are normal. Pupils are equal, round, and reactive to light. No scleral icterus.   Neck: Normal range of motion. Neck supple.   Cardiovascular: Normal rate, regular rhythm, normal heart sounds and intact distal pulses.   Pulmonary/Chest: Effort normal and breath sounds normal. She exhibits no tenderness.   Abdominal: Soft. There is no tenderness.   Musculoskeletal: Normal range of motion. She exhibits tenderness. She exhibits no edema.   Neurological: She is alert. She exhibits abnormal muscle tone.   Skin: Skin is warm and dry. No rash noted. She is not diaphoretic.   Psychiatric: Her behavior is normal.   Sadness and depression associated with the death of her spouse   Vitals reviewed.      ED Course (with Medical Decision Making)    Pt seen and examined by me.  RN and EPIC notes reviewed.      Patient with some chest tightness as noted above.  She is under a lot of stress and notes decreased sleep and anxiety after the death of her .  She just buried him today.  She has significantly increased her cigarette use.  On exam, she is anxious, vital signs unremarkable.    Patient was given aspirin, EKG, labs, chest x-ray checked.  EKG shows normal sinus rhythm with a rate of 92.  There are no ectopic beats, no abnormal rhythms, no ST segment abnormalities.  No recent EKG to compare this to.    Patient's labs are unremarkable as noted below including troponin and d-dimer.  Chest x-ray clear.    I discussed all these results with the patient and her daughter.  I am going to have her follow-up with her primary care provider as she may need a stress test in the  future.  There is always a chance of a cardiomyopathy developing with all of the stress in her life and I would like her to have close follow-up.    Of note, patient has not slept for any number of days.  She was given some Ativan to use as needed for anxiety and sleep.        Procedures    Results for orders placed or performed during the hospital encounter of 01/08/19 (from the past 24 hour(s))   CBC with platelets differential   Result Value Ref Range    WBC 9.5 4.0 - 11.0 10e9/L    RBC Count 4.69 3.8 - 5.2 10e12/L    Hemoglobin 13.7 11.7 - 15.7 g/dL    Hematocrit 40.4 35.0 - 47.0 %    MCV 86 78 - 100 fl    MCH 29.2 26.5 - 33.0 pg    MCHC 33.9 31.5 - 36.5 g/dL    RDW 13.0 10.0 - 15.0 %    Platelet Count 298 150 - 450 10e9/L    Diff Method Automated Method     % Neutrophils 52.1 %    % Lymphocytes 38.8 %    % Monocytes 5.5 %    % Eosinophils 2.9 %    % Basophils 0.5 %    % Immature Granulocytes 0.2 %    Nucleated RBCs 0 0 /100    Absolute Neutrophil 4.9 1.6 - 8.3 10e9/L    Absolute Lymphocytes 3.7 0.8 - 5.3 10e9/L    Absolute Monocytes 0.5 0.0 - 1.3 10e9/L    Absolute Basophils 0.1 0.0 - 0.2 10e9/L    Abs Immature Granulocytes 0.0 0 - 0.4 10e9/L    Absolute Nucleated RBC 0.0    D dimer quantitative   Result Value Ref Range    D Dimer 0.3 0.0 - 0.50 ug/ml FEU   Comprehensive metabolic panel   Result Value Ref Range    Sodium 145 (H) 133 - 144 mmol/L    Potassium 3.5 3.4 - 5.3 mmol/L    Chloride 112 (H) 94 - 109 mmol/L    Carbon Dioxide 26 20 - 32 mmol/L    Anion Gap 7 3 - 14 mmol/L    Glucose 100 (H) 70 - 99 mg/dL    Urea Nitrogen 16 7 - 30 mg/dL    Creatinine 0.74 0.52 - 1.04 mg/dL    GFR Estimate >90 >60 mL/min/[1.73_m2]    GFR Estimate If Black >90 >60 mL/min/[1.73_m2]    Calcium 8.5 8.5 - 10.1 mg/dL    Bilirubin Total 0.2 0.2 - 1.3 mg/dL    Albumin 3.5 3.4 - 5.0 g/dL    Protein Total 6.7 (L) 6.8 - 8.8 g/dL    Alkaline Phosphatase 82 40 - 150 U/L    ALT 22 0 - 50 U/L    AST 11 0 - 45 U/L   Lipase   Result Value  Ref Range    Lipase 178 73 - 393 U/L   Troponin I   Result Value Ref Range    Troponin I ES <0.015 0.000 - 0.045 ug/L   XR Chest 2 Views    Narrative    XR CHEST TWO VIEWS   1/8/2019 8:45 PM     HISTORY: Chest tightness.    COMPARISON: None.    FINDINGS: The heart size is normal. The lungs are clear. No  pneumothorax or pleural effusion.      Impression    IMPRESSION: No acute abnormality.    MADINA TAYLOR MD       Medications   aspirin (ASA) chewable tablet 324 mg (324 mg Oral Given 1/8/19 2037)       Assessments & Plan     I have reviewed the findings, diagnosis, plan and need for follow up with the patient.       Medication List      Started    LORazepam 1 MG tablet  Commonly known as:  ATIVAN  0.5-1 mg, Oral, EVERY 6 HOURS PRN            Final diagnoses:   Chest tightness   Death of      Disposition: Patient discharged home in stable condition.  Plan as above.  Return for concerns.     Note: Chart documentation done in part with Dragon Voice Recognition software. Although reviewed after completion, some word and grammatical errors may remain.     1/8/2019   [facility] EMERGENCY DEPARTMENT     Marine Temple MD  01/09/19 0305

## 2019-01-10 ENCOUNTER — OFFICE VISIT (OUTPATIENT)
Dept: FAMILY MEDICINE | Facility: CLINIC | Age: 54
End: 2019-01-10
Payer: COMMERCIAL

## 2019-01-10 VITALS
HEIGHT: 68 IN | BODY MASS INDEX: 26.07 KG/M2 | WEIGHT: 172 LBS | DIASTOLIC BLOOD PRESSURE: 60 MMHG | RESPIRATION RATE: 18 BRPM | OXYGEN SATURATION: 99 % | TEMPERATURE: 98.2 F | HEART RATE: 82 BPM | SYSTOLIC BLOOD PRESSURE: 94 MMHG

## 2019-01-10 DIAGNOSIS — N95.1 MENOPAUSAL SYNDROME (HOT FLASHES): ICD-10-CM

## 2019-01-10 DIAGNOSIS — G47.09 OTHER INSOMNIA: ICD-10-CM

## 2019-01-10 DIAGNOSIS — Z13.220 LIPID SCREENING: ICD-10-CM

## 2019-01-10 DIAGNOSIS — R07.89 CHEST TIGHTNESS OR PRESSURE: ICD-10-CM

## 2019-01-10 DIAGNOSIS — F41.1 GAD (GENERALIZED ANXIETY DISORDER): ICD-10-CM

## 2019-01-10 DIAGNOSIS — F43.21 SITUATIONAL DEPRESSION: ICD-10-CM

## 2019-01-10 LAB
CHOLEST SERPL-MCNC: 205 MG/DL
HDLC SERPL-MCNC: 50 MG/DL
LDLC SERPL CALC-MCNC: 118 MG/DL
NONHDLC SERPL-MCNC: 155 MG/DL
TRIGL SERPL-MCNC: 187 MG/DL
TSH SERPL DL<=0.005 MIU/L-ACNC: 1.82 MU/L (ref 0.4–4)

## 2019-01-10 PROCEDURE — 84443 ASSAY THYROID STIM HORMONE: CPT | Performed by: FAMILY MEDICINE

## 2019-01-10 PROCEDURE — 99214 OFFICE O/P EST MOD 30 MIN: CPT | Performed by: FAMILY MEDICINE

## 2019-01-10 PROCEDURE — 80061 LIPID PANEL: CPT | Performed by: FAMILY MEDICINE

## 2019-01-10 PROCEDURE — 36415 COLL VENOUS BLD VENIPUNCTURE: CPT | Performed by: FAMILY MEDICINE

## 2019-01-10 RX ORDER — MEDROXYPROGESTERONE ACETATE 2.5 MG/1
2.5 TABLET ORAL DAILY
Qty: 90 TABLET | Refills: 3 | Status: SHIPPED | OUTPATIENT
Start: 2019-01-10 | End: 2020-01-10

## 2019-01-10 RX ORDER — HYDROXYZINE HYDROCHLORIDE 25 MG/1
25-50 TABLET, FILM COATED ORAL
Qty: 90 TABLET | Refills: 5 | Status: SHIPPED | OUTPATIENT
Start: 2019-01-10 | End: 2019-03-22

## 2019-01-10 ASSESSMENT — ANXIETY QUESTIONNAIRES
3. WORRYING TOO MUCH ABOUT DIFFERENT THINGS: NEARLY EVERY DAY
1. FEELING NERVOUS, ANXIOUS, OR ON EDGE: NEARLY EVERY DAY
GAD7 TOTAL SCORE: 15
2. NOT BEING ABLE TO STOP OR CONTROL WORRYING: NEARLY EVERY DAY
6. BECOMING EASILY ANNOYED OR IRRITABLE: NOT AT ALL
7. FEELING AFRAID AS IF SOMETHING AWFUL MIGHT HAPPEN: MORE THAN HALF THE DAYS
5. BEING SO RESTLESS THAT IT IS HARD TO SIT STILL: SEVERAL DAYS

## 2019-01-10 ASSESSMENT — MIFFLIN-ST. JEOR: SCORE: 1438.45

## 2019-01-10 ASSESSMENT — PATIENT HEALTH QUESTIONNAIRE - PHQ9
5. POOR APPETITE OR OVEREATING: NEARLY EVERY DAY
SUM OF ALL RESPONSES TO PHQ QUESTIONS 1-9: 18

## 2019-01-10 ASSESSMENT — PAIN SCALES - GENERAL: PAINLEVEL: NO PAIN (0)
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
## 2019-01-10 NOTE — NURSING NOTE
Chief Complaint   Patient presents with     ER F/U     1/8/2019 Chest Tightness     Depression      just passed away 12/24/2018     Anxiety     MP/MA

## 2019-01-11 ENCOUNTER — TELEPHONE (OUTPATIENT)
Dept: FAMILY MEDICINE | Facility: CLINIC | Age: 54
End: 2019-01-11

## 2019-01-11 DIAGNOSIS — N95.1 SYMPTOMATIC MENOPAUSAL OR FEMALE CLIMACTERIC STATES: Primary | ICD-10-CM

## 2019-01-11 ASSESSMENT — ANXIETY QUESTIONNAIRES: GAD7 TOTAL SCORE: 15

## 2019-01-11 NOTE — TELEPHONE ENCOUNTER
Is there a generic form of the medication that insurance will pay for?    Electronically signed by:  Fernando Fonseca M.D.  1/11/2019

## 2019-01-11 NOTE — TELEPHONE ENCOUNTER
Premarin was $471 for a 3 month supply: I found a coupon: but it only takes off $55. Richmond states she cannot afford this until her deductible is met.     Could an alternative be ordered for her for now?    Thanks  Olesya Bain [facility]   [phone]

## 2019-01-14 ENCOUNTER — TELEPHONE (OUTPATIENT)
Dept: BEHAVIORAL HEALTH | Facility: CLINIC | Age: 54
End: 2019-01-14

## 2019-01-14 RX ORDER — ESTRADIOL 0.5 MG/1
0.5 TABLET ORAL DAILY
Qty: 90 TABLET | Refills: 3 | Status: SHIPPED | OUTPATIENT
Start: 2019-01-14 | End: 2021-02-01

## 2019-01-14 NOTE — TELEPHONE ENCOUNTER
I sent a prescription for estradiol 0.5 mg tabs to take once daily.  Remind her that she needs to take this with the Provera because she still has uterus and we can have unopposed estrogen use when the uterus is still present.    Electronically signed by:  Fernando Fonseca M.D.  1/14/2019

## 2019-01-14 NOTE — TELEPHONE ENCOUNTER
Phone Encounter   [facility] made attempt to reach patient, by PCP request. LM requesting a returned call and provided call back number.

## 2019-02-05 ENCOUNTER — OFFICE VISIT (OUTPATIENT)
Dept: BEHAVIORAL HEALTH | Facility: CLINIC | Age: 54
End: 2019-02-05
Payer: COMMERCIAL

## 2019-02-05 DIAGNOSIS — F43.23 ADJUSTMENT DISORDER WITH MIXED ANXIETY AND DEPRESSED MOOD: Primary | ICD-10-CM

## 2019-02-05 PROCEDURE — 90791 PSYCH DIAGNOSTIC EVALUATION: CPT | Performed by: MARRIAGE & FAMILY THERAPIST

## 2019-02-05 ASSESSMENT — ANXIETY QUESTIONNAIRES
3. WORRYING TOO MUCH ABOUT DIFFERENT THINGS: MORE THAN HALF THE DAYS
6. BECOMING EASILY ANNOYED OR IRRITABLE: MORE THAN HALF THE DAYS
GAD7 TOTAL SCORE: 15
2. NOT BEING ABLE TO STOP OR CONTROL WORRYING: NEARLY EVERY DAY
1. FEELING NERVOUS, ANXIOUS, OR ON EDGE: NEARLY EVERY DAY
5. BEING SO RESTLESS THAT IT IS HARD TO SIT STILL: SEVERAL DAYS
7. FEELING AFRAID AS IF SOMETHING AWFUL MIGHT HAPPEN: SEVERAL DAYS
IF YOU CHECKED OFF ANY PROBLEMS ON THIS QUESTIONNAIRE, HOW DIFFICULT HAVE THESE PROBLEMS MADE IT FOR YOU TO DO YOUR WORK, TAKE CARE OF THINGS AT HOME, OR GET ALONG WITH OTHER PEOPLE: EXTREMELY DIFFICULT

## 2019-02-05 ASSESSMENT — PATIENT HEALTH QUESTIONNAIRE - PHQ9
5. POOR APPETITE OR OVEREATING: NEARLY EVERY DAY
SUM OF ALL RESPONSES TO PHQ QUESTIONS 1-9: 17
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
## 2019-02-06 ASSESSMENT — ANXIETY QUESTIONNAIRES: GAD7 TOTAL SCORE: 15

## 2019-02-25 ENCOUNTER — OFFICE VISIT (OUTPATIENT)
Dept: BEHAVIORAL HEALTH | Facility: CLINIC | Age: 54
End: 2019-02-25
Payer: COMMERCIAL

## 2019-02-25 DIAGNOSIS — F43.23 ADJUSTMENT DISORDER WITH MIXED ANXIETY AND DEPRESSED MOOD: Primary | ICD-10-CM

## 2019-02-25 PROCEDURE — 90834 PSYTX W PT 45 MINUTES: CPT | Performed by: MARRIAGE & FAMILY THERAPIST

## 2019-02-25 NOTE — PROGRESS NOTES
[facility]  February 25, 2019      Behavioral Health Clinician Progress Note    Patient Name: Richmond Montanez           Service Type:  Individual      Service Location:   Face to Face in Clinic     Session Start Time: 3:10  Session End Time: 3:50      Session Length: 38 - 52      Attendees: Patient    Visit Activities (Refresh list every visit): [facility] Only    Diagnostic Assessment Date: 2/5/19  Treatment Plan Review Date: due next visit  See Flowsheets for today's PHQ-9 and DIDI-7 results  Previous PHQ-9:   PHQ-9 SCORE 1/10/2019 2/5/2019   PHQ-9 Total Score 18 17     Previous DIDI-7:   DIDI-7 SCORE 1/10/2019 2/5/2019   Total Score 15 15       COLIN LEVEL:  No flowsheet data found.    DATA  Extended Session (60+ minutes): No  Interactive Complexity: No  Crisis: No  [facility] Patient: No    Treatment Objective(s) Addressed in This Session:  Target Behavior(s): Grief with depression and anxiety    Depressed Mood: Increase interest, engagement, and pleasure in doing things  Decrease frequency and intensity of feeling down, depressed, hopeless  Improve quantity and quality of night time sleep / decrease daytime naps  Feel less tired and more energy during the day   Improve diet, appetite, mindful eating, and / or meal planning  Identify negative self-talk and behaviors: challenge core beliefs, myths, and actions  Improve concentration, focus, and mindfulness in daily activities   Anxiety: will experience a reduction in anxiety, will develop more effective coping skills to manage anxiety symptoms, will develop healthy cognitive patterns and beliefs and will increase ability to function adaptively  Adjustment Difficulties: will develop coping/problem-solving skills to facilitate more adaptive adjustment  Grief / Loss: will increase understanding of normal grieving process, will engage in effective approach to address and resolve grief/loss issues and will process grief/loss issues in an adaptive  manner    Current Stressors / Issues:  Patient continues to have difficulty going out and doing things. Patient identified that she hears her daughter encouraging her to go out and she feels a sense of guilt with this idea as well.   Patient continues to have low energy and frequent crying. She states that she is preparing for an Auction on 4/5 of her spouse's big farm equipment. She recognizes that this will likely be an emotional day for her. Encouraged her to have support there during the event. Discussed taking time to recognize what these objects represent regarding her memories of her spouse and being able to do this prior to the auction itself.     Encouraged self care. Discussed finding a project or hobby to engage in. Suggested making a change to the bathroom so that she does not get the image of her spouse each time she is looking into it. Discussed mindful self-compassion and encouraged her to recognize where she is at emotionally and allow herself to take time. Suggested she find at least one task to do a day and build from there. Encouraged structure and routine with eating and sleeping.      Progress on Treatment Objective(s) / Homework:  No improvement - ACTION (Actively working towards change); Intervened by reinforcing change plan / affirming steps taken    Motivational Interviewing    MI Intervention: Expressed Empathy/Understanding, Supported Autonomy, Collaboration, Evocation, Permission to raise concern or advise, Open-ended questions, Reflections: simple and complex, Change talk (evoked) and Reframe     Change Talk Expressed by the Patient: Desire to change Ability to change Reasons to change Need to change Committment to change Activation Taking steps    Provider Response to Change Talk: E - Evoked more info from patient about behavior change, A - Affirmed patient's thoughts, decisions, or attempts at behavior change, R - Reflected patient's change talk and S - Summarized patient's change talk  statements    Also provided psychoeducation about behavioral health condition, symptoms, and treatment options    Care Plan review completed: Yes    Medication Review:  No changes to current psychiatric medication(s)    Medication Compliance:  Yes    Changes in Health Issues:   None reported    Chemical Use Review:   Substance Use: Chemical use reviewed, no active concerns identified      Tobacco Use: No change in amount of tobacco use since last session.  Patient declined discussion at this time    Assessment: Current Emotional / Mental Status (status of significant symptoms):  Risk status (Self / Other harm or suicidal ideation)  Patient denies a history of suicidal ideation, suicide attempts, self-injurious behavior, homicidal ideation, homicidal behavior and and other safety concerns  Patient denies current fears or concerns for personal safety.  Patient denies current or recent suicidal ideation or behaviors.  Patient denies current or recent homicidal ideation or behaviors.  Patient denies current or recent self injurious behavior or ideation.  Patient denies other safety concerns.  A safety and risk management plan has not been developed at this time, however patient was encouraged to call [place] [phone] should there be a change in any of these risk factors.    Appearance:   Appropriate   Eye Contact:   Good   Psychomotor Behavior: Normal   Attitude:   Cooperative   Orientation:   All  Speech   Rate / Production: Normal    Volume:  Normal   Mood:    Depressed  Sad   Affect:    Appropriate   Thought Content:  Clear   Thought Form:  Coherent  Logical   Insight:    Good     Diagnoses:  1. Adjustment disorder with mixed anxiety and depressed mood        Collateral Reports Completed:  Not Applicable    Plan: (Homework, other):  Patient was given information about behavioral services and encouraged to schedule a follow up appointment with the clinic [facility] in 1 week.  She was also given information about mental  health symptoms and treatment options  and Cognitive Behavioral Therapy skills to practice when experiencing anxiety and depression.  CD Recommendations: No indications of CD issues.  JOB Bolanos, [facility]

## 2019-02-28 ENCOUNTER — OFFICE VISIT (OUTPATIENT)
Dept: FAMILY MEDICINE | Facility: CLINIC | Age: 54
End: 2019-02-28
Payer: COMMERCIAL

## 2019-02-28 VITALS
DIASTOLIC BLOOD PRESSURE: 70 MMHG | RESPIRATION RATE: 18 BRPM | WEIGHT: 176 LBS | SYSTOLIC BLOOD PRESSURE: 100 MMHG | HEART RATE: 94 BPM | OXYGEN SATURATION: 100 % | TEMPERATURE: 97.3 F | BODY MASS INDEX: 26.53 KG/M2

## 2019-02-28 DIAGNOSIS — F43.21 SITUATIONAL DEPRESSION: ICD-10-CM

## 2019-02-28 DIAGNOSIS — F41.1 GAD (GENERALIZED ANXIETY DISORDER): ICD-10-CM

## 2019-02-28 PROBLEM — F43.23 ADJUSTMENT DISORDER WITH MIXED ANXIETY AND DEPRESSED MOOD: Status: ACTIVE | Noted: 2019-02-28

## 2019-02-28 PROCEDURE — 99214 OFFICE O/P EST MOD 30 MIN: CPT | Performed by: FAMILY MEDICINE

## 2019-02-28 RX ORDER — BUSPIRONE HYDROCHLORIDE 5 MG/1
5 TABLET ORAL 3 TIMES DAILY
Qty: 90 TABLET | Refills: 3 | Status: SHIPPED | OUTPATIENT
Start: 2019-02-28 | End: 2019-04-02

## 2019-02-28 ASSESSMENT — ANXIETY QUESTIONNAIRES
GAD7 TOTAL SCORE: 9
7. FEELING AFRAID AS IF SOMETHING AWFUL MIGHT HAPPEN: NOT AT ALL
2. NOT BEING ABLE TO STOP OR CONTROL WORRYING: MORE THAN HALF THE DAYS
3. WORRYING TOO MUCH ABOUT DIFFERENT THINGS: NEARLY EVERY DAY
6. BECOMING EASILY ANNOYED OR IRRITABLE: SEVERAL DAYS
IF YOU CHECKED OFF ANY PROBLEMS ON THIS QUESTIONNAIRE, HOW DIFFICULT HAVE THESE PROBLEMS MADE IT FOR YOU TO DO YOUR WORK, TAKE CARE OF THINGS AT HOME, OR GET ALONG WITH OTHER PEOPLE: VERY DIFFICULT
1. FEELING NERVOUS, ANXIOUS, OR ON EDGE: SEVERAL DAYS
5. BEING SO RESTLESS THAT IT IS HARD TO SIT STILL: NOT AT ALL

## 2019-02-28 ASSESSMENT — PATIENT HEALTH QUESTIONNAIRE - PHQ9
SUM OF ALL RESPONSES TO PHQ QUESTIONS 1-9: 15
5. POOR APPETITE OR OVEREATING: MORE THAN HALF THE DAYS

## 2019-02-28 ASSESSMENT — PAIN SCALES - GENERAL: PAINLEVEL: NO PAIN (0)

## 2019-02-28 NOTE — PROGRESS NOTES
SUBJECTIVE:   Richmond Montanez is a 53 year old female who presents to clinic today for the following health issues:      Depression Followup    Status since last visit: Stable     See PHQ-9 for current symptoms.  Other associated symptoms: None    Complicating factors:   Significant life event:  Yes-   had heart attack and passed away on December 30th, 2018   Current substance abuse:  None  Anxiety or Panic symptoms:  Yes-  Anxiety attacks    PHQ 1/10/2019 2/5/2019 2/28/2019   PHQ-9 Total Score 18 17 15   Q9: Suicide Ideation Not at all Not at all Not at all     DIDI-7 SCORE 1/10/2019 2/5/2019 2/28/2019   Total Score 15 15 9       In the past two weeks have you had thoughts of suicide or self-harm?  No.    Do you have concerns about your personal safety or the safety of others?   No  PHQ-9  English  PHQ-9   Any Language  Suicide Assessment Five-step Evaluation and Treatment (SAFE-T)    Amount of exercise or physical activity: None    Problems taking medications regularly: No    Medication side effects: none    Diet: regular (no restrictions)        PROBLEMS TO ADD ON...  Patient is still very sad over the loss of her  and the 2-month anniversary is tomorrow.  She has spontaneous episodes of crying and grieving at home and really is not doing much of anything at home other than small projects here and there.  He feels like her depression score is a little bit better but still having some anxiety where she gets pressure in her chest and difficulty breathing.  She has her farm auction all set up for mid April but now is being told from her job that she needs to be back to work by March 14 her 17th.  They will guarantee her job up to 12 weeks of leave but after that they cannot guarantee to hold her job for her.  She was also denied her short-term disability and received an email stating that it was denied that no particular reason.  We talked about this and told her that she really needs to contact HR and  find out what specifically was the reason she was denied her short-term disability because with her situational depression and anxiety from the death of her  which was a sudden episode of cardiac arrest there is no reason why she should be unqualified for short-term disability.  She is not drinking any alcohol at all but does feel that sometimes she would like to.  She does have good support from her sister and family but her 27-year-old daughter is still struggling also.  Her daughter is not her 's biological child but was very involved with her life.  She continues to see her therapist on a weekly basis she does feel that is helpful.  She would like to go up to the higher dose of sertraline.  Currently she is taking 25 mg and would like to go up to 50 mg.  She definitely feels that the Ativan helps with her anxiety but she does not want to become dependent on it so has not been using it.  The Vistaril does help her rest better at night but it does not do anything for her anxiety.    Problem list and histories reviewed & adjusted, as indicated.  Additional history: as documented    Patient Active Problem List   Diagnosis     Female infertility     Personal history of tobacco use, presenting hazards to health     Deviated nasal septum     Dyspnea and respiratory abnormality     Situational depression     DIDI (generalized anxiety disorder)     Menopausal syndrome (hot flashes)     Other insomnia     Chest tightness or pressure     Adjustment disorder with mixed anxiety and depressed mood     Past Surgical History:   Procedure Laterality Date     HC REPAIR OF NASAL SEPTUM  04/22/08    Septoplasty. Resect inferior turbinates, Partial uvulectomy.       Social History     Tobacco Use     Smoking status: Current Every Day Smoker     Packs/day: 1.00     Years: 15.00     Pack years: 15.00     Smokeless tobacco: Never Used   Substance Use Topics     Alcohol use: Yes     Comment: once per year     Family History    Problem Relation Age of Onset     Arthritis Mother      Diabetes Mother         insulin type II     Thyroid Disease Mother         hypo     EYE* Mother         glaucoma     Lung Cancer Mother         d/c 78     Cancer Father         Cancer from asbestos exposure 1999 (Mesothelioma)     Hypertension Father      Kidney Disease Sister         Kidney transplant 2/2 kidney failure due to NSAID use     Alcohol/Drug No family hx of      Allergies No family hx of      Alzheimer Disease No family hx of      Anesthesia Reaction No family hx of      Blood Disease No family hx of      Depression No family hx of      Genetic Disorder No family hx of      Gastrointestinal Disease No family hx of      Genitourinary Problems No family hx of      Gynecology No family hx of      Heart Disease No family hx of      Lipids No family hx of      Neurologic Disorder No family hx of      Osteoporosis No family hx of      Psychotic Disorder No family hx of      Respiratory No family hx of      Cerebrovascular Disease No family hx of      Obesity No family hx of          Allergies   Allergen Reactions     Penicillins      Sulfa Drugs      Recent Labs   Lab Test 01/10/19  1407 01/08/19  2030 04/26/17  1624 05/26/14  1315   *  --  134*  --    HDL 50  --  55  --    TRIG 187*  --  227*  --    ALT  --  22  --  28   CR  --  0.74  --  0.62   GFRESTIMATED  --  >90  --  >90   GFRESTBLACK  --  >90  --  >90   POTASSIUM  --  3.5  --  3.6   TSH 1.82  --  1.89  --       BP Readings from Last 3 Encounters:   02/28/19 100/70   01/10/19 94/60   01/08/19 112/70    Wt Readings from Last 3 Encounters:   02/28/19 79.8 kg (176 lb)   01/10/19 78 kg (172 lb)   03/09/18 78.9 kg (174 lb)                    Reviewed and updated as needed this visit by clinical staff  Tobacco  Allergies  Meds  Med Hx  Surg Hx  Fam Hx  Soc Hx      Reviewed and updated as needed this visit by Provider         ROS:  Constitutional, HEENT, cardiovascular, pulmonary, gi and gu  systems are negative, except as otherwise noted.    OBJECTIVE:     /70   Pulse 94   Temp 97.3  F (36.3  C) (Temporal)   Resp 18   Wt 79.8 kg (176 lb)   LMP 05/12/2014 (Approximate)   SpO2 100%   BMI 26.53 kg/m    Body mass index is 26.53 kg/m .  GENERAL: healthy, alert and no distress, but she is very sad and tearful in the office.  It looks like she is been crying a lot today because her eyes are reddened.  She has good eye contact and responds appropriately to questions.  No particular exam was done today.    Diagnostic Test Results:  none     ASSESSMENT/PLAN:   (F43.21) Situational depression  Comment: Depression since the recent death of her  which was a sudden cardiac arrest.  Plan: sertraline (ZOLOFT) 50 MG tablet        We will increase her sertraline to 50 mg daily.  She will continue with her therapy sessions with [facility].  I also encouraged her to start working on such a small projects around the house to keep herself busy so she is not spending so much time in the recliner resting will help her sleep better at night.  I also encouraged her to talk with HR and investigate further why her short-term disability was denied if she needs anything for me to address this would be willing to do that for her.    (F41.1) DIDI (generalized anxiety disorder)  Comment: Anxiety is improved but she would like to have something that would keep her more calm throughout the day when she has bad days of anxiety.  Plan: sertraline (ZOLOFT) 50 MG tablet, busPIRone         (BUSPAR) 5 MG tablet        We are going to try BuSpar 5 mg p.o. 3 times daily and if she needs to she can increase by 5 mg every 3-4 days to a maximum dose of 50 mg 3 times daily.  Encouraged her to stay at the lowest effective dose and not increasing lissencephaly necessary.    I will see her in 2 months for follow-up to see how things are going with the new medication changes.       Tobacco Cessation:   reports that she has been smoking.   "She has a 15.00 pack-year smoking history. she has never used smokeless tobacco.  Tobacco Cessation Action Plan: Not addressed today.    BMI:   Estimated body mass index is 26.53 kg/m  as calculated from the following:    Height as of 1/10/19: 1.735 m (5' 8.3\").    Weight as of this encounter: 79.8 kg (176 lb).   Weight management plan: Not addressed today.    Electronically signed by:  Fernando Fonseca M.D.  2/28/2019    "

## 2019-03-01 ASSESSMENT — ANXIETY QUESTIONNAIRES: GAD7 TOTAL SCORE: 9

## 2019-03-14 ENCOUNTER — HOSPITAL ENCOUNTER (EMERGENCY)
Facility: CLINIC | Age: 54
Discharge: HOME | End: 2019-03-15
Admitting: NURSE PRACTITIONER
Payer: COMMERCIAL

## 2019-03-14 ENCOUNTER — APPOINTMENT (OUTPATIENT)
Dept: GENERAL RADIOLOGY | Facility: CLINIC | Age: 54
End: 2019-03-14
Attending: NURSE PRACTITIONER
Payer: COMMERCIAL

## 2019-03-14 DIAGNOSIS — R07.89 CHEST DISCOMFORT: ICD-10-CM

## 2019-03-14 LAB
ALBUMIN SERPL-MCNC: 3.8 G/DL (ref 3.4–5)
ALP SERPL-CCNC: 112 U/L (ref 40–150)
ALT SERPL W P-5'-P-CCNC: 21 U/L (ref 0–50)
ANION GAP SERPL CALCULATED.3IONS-SCNC: 10 MMOL/L (ref 3–14)
AST SERPL W P-5'-P-CCNC: 16 U/L (ref 0–45)
BASOPHILS # BLD AUTO: 0.1 10E9/L (ref 0–0.2)
BASOPHILS NFR BLD AUTO: 0.5 %
BILIRUB SERPL-MCNC: 0.2 MG/DL (ref 0.2–1.3)
BUN SERPL-MCNC: 23 MG/DL (ref 7–30)
CALCIUM SERPL-MCNC: 8.3 MG/DL (ref 8.5–10.1)
CHLORIDE SERPL-SCNC: 110 MMOL/L (ref 94–109)
CO2 SERPL-SCNC: 22 MMOL/L (ref 20–32)
CREAT SERPL-MCNC: 0.8 MG/DL (ref 0.52–1.04)
D DIMER PPP FEU-MCNC: 0.3 UG/ML FEU (ref 0–0.5)
DIFFERENTIAL METHOD BLD: ABNORMAL
EOSINOPHIL NFR BLD AUTO: 4.4 %
ERYTHROCYTE [DISTWIDTH] IN BLOOD BY AUTOMATED COUNT: 13.5 % (ref 10–15)
GFR SERPL CREATININE-BSD FRML MDRD: 83 ML/MIN/{1.73_M2}
GLUCOSE SERPL-MCNC: 105 MG/DL (ref 70–99)
HCT VFR BLD AUTO: 41 % (ref 35–47)
HGB BLD-MCNC: 13.9 G/DL (ref 11.7–15.7)
IMM GRANULOCYTES # BLD: 0 10E9/L (ref 0–0.4)
IMM GRANULOCYTES NFR BLD: 0.3 %
LIPASE SERPL-CCNC: 180 U/L (ref 73–393)
LYMPHOCYTES # BLD AUTO: 3.6 10E9/L (ref 0.8–5.3)
LYMPHOCYTES NFR BLD AUTO: 31.6 %
MAGNESIUM SERPL-MCNC: 2.3 MG/DL (ref 1.6–2.3)
MCH RBC QN AUTO: 29 PG (ref 26.5–33)
MCHC RBC AUTO-ENTMCNC: 33.9 G/DL (ref 31.5–36.5)
MCV RBC AUTO: 86 FL (ref 78–100)
MONOCYTES # BLD AUTO: 0.7 10E9/L (ref 0–1.3)
MONOCYTES NFR BLD AUTO: 5.9 %
NEUTROPHILS # BLD AUTO: 6.6 10E9/L (ref 1.6–8.3)
NEUTROPHILS NFR BLD AUTO: 57.3 %
NRBC # BLD AUTO: 0 10*3/UL
NRBC BLD AUTO-RTO: 0 /100
PLATELET # BLD AUTO: 349 10E9/L (ref 150–450)
POTASSIUM SERPL-SCNC: 3.5 MMOL/L (ref 3.4–5.3)
PROT SERPL-MCNC: 7.3 G/DL (ref 6.8–8.8)
RBC # BLD AUTO: 4.79 10E12/L (ref 3.8–5.2)
SODIUM SERPL-SCNC: 142 MMOL/L (ref 133–144)
TROPONIN I SERPL-MCNC: <0.015 UG/L (ref 0–0.04)
TSH SERPL DL<=0.005 MIU/L-ACNC: 3.51 MU/L (ref 0.4–4)
WBC # BLD AUTO: 11.5 10E9/L (ref 4–11)

## 2019-03-14 PROCEDURE — 71046 X-RAY EXAM CHEST 2 VIEWS: CPT | Mod: TC

## 2019-03-14 PROCEDURE — 93010 ELECTROCARDIOGRAM REPORT: CPT | Mod: Z6 | Performed by: NURSE PRACTITIONER

## 2019-03-14 PROCEDURE — 80053 COMPREHEN METABOLIC PANEL: CPT | Performed by: NURSE PRACTITIONER

## 2019-03-14 PROCEDURE — 84443 ASSAY THYROID STIM HORMONE: CPT | Performed by: NURSE PRACTITIONER

## 2019-03-14 PROCEDURE — 85025 COMPLETE CBC W/AUTO DIFF WBC: CPT | Performed by: NURSE PRACTITIONER

## 2019-03-14 PROCEDURE — 85379 FIBRIN DEGRADATION QUANT: CPT | Performed by: NURSE PRACTITIONER

## 2019-03-14 PROCEDURE — 25000132 ZZH RX MED GY IP 250 OP 250 PS 637: Performed by: NURSE PRACTITIONER

## 2019-03-14 PROCEDURE — 83735 ASSAY OF MAGNESIUM: CPT | Performed by: NURSE PRACTITIONER

## 2019-03-14 PROCEDURE — 84484 ASSAY OF TROPONIN QUANT: CPT | Performed by: NURSE PRACTITIONER

## 2019-03-14 PROCEDURE — 99285 EMERGENCY DEPT VISIT HI MDM: CPT | Performed by: NURSE PRACTITIONER

## 2019-03-14 PROCEDURE — 99285 EMERGENCY DEPT VISIT HI MDM: CPT | Mod: 25 | Performed by: NURSE PRACTITIONER

## 2019-03-14 PROCEDURE — 93005 ELECTROCARDIOGRAM TRACING: CPT | Performed by: NURSE PRACTITIONER

## 2019-03-14 PROCEDURE — 83690 ASSAY OF LIPASE: CPT | Performed by: NURSE PRACTITIONER

## 2019-03-14 RX ORDER — ASPIRIN 81 MG/1
324 TABLET, CHEWABLE ORAL ONCE
Status: COMPLETED | OUTPATIENT
Start: 2019-03-14 | End: 2019-03-14

## 2019-03-14 RX ADMIN — ASPIRIN 81 MG 324 MG: 81 TABLET ORAL at 23:10

## 2019-03-14 ASSESSMENT — ENCOUNTER SYMPTOMS
NERVOUS/ANXIOUS: 1
DIZZINESS: 0
EYE REDNESS: 0
FACIAL ASYMMETRY: 0
PALPITATIONS: 1
HEMATOLOGIC/LYMPHATIC NEGATIVE: 1
MUSCULOSKELETAL NEGATIVE: 1
NUMBNESS: 0
COUGH: 0
WEAKNESS: 0
NAUSEA: 0
CONSTITUTIONAL NEGATIVE: 1
SPEECH DIFFICULTY: 0
CHEST TIGHTNESS: 1
HEADACHES: 0
SHORTNESS OF BREATH: 0
VOMITING: 0
LIGHT-HEADEDNESS: 0

## 2019-03-14 NOTE — ED AVS SNAPSHOT
[facility]  [address] [place] [address]  Phone:  [phone]  Fax:  [phone]                                    Richmond Montanez   MRN: [id number]    Department:  [facility] Department   Date of Visit:  3/14/2019           After Visit Summary Signature Page    I have received my discharge instructions, and my questions have been answered. I have discussed any challenges I see with this plan with the nurse or doctor.    ..........................................................................................................................................  Patient/Patient Representative Signature      ..........................................................................................................................................  Patient Representative Print Name and Relationship to Patient    ..................................................               ................................................  Date                                   Time    ..........................................................................................................................................  Reviewed by Signature/Title    ...................................................              ..............................................  Date                                               Time          22EPIC Rev 08/18

## 2019-03-15 VITALS
DIASTOLIC BLOOD PRESSURE: 69 MMHG | TEMPERATURE: 97.5 F | OXYGEN SATURATION: 96 % | BODY MASS INDEX: 25.62 KG/M2 | WEIGHT: 170 LBS | HEART RATE: 89 BPM | RESPIRATION RATE: 17 BRPM | SYSTOLIC BLOOD PRESSURE: 101 MMHG

## 2019-03-15 LAB — TROPONIN I SERPL-MCNC: <0.015 UG/L (ref 0–0.04)

## 2019-03-15 PROCEDURE — 36415 COLL VENOUS BLD VENIPUNCTURE: CPT | Performed by: NURSE PRACTITIONER

## 2019-03-15 PROCEDURE — 84484 ASSAY OF TROPONIN QUANT: CPT | Performed by: NURSE PRACTITIONER

## 2019-03-15 NOTE — ED TRIAGE NOTES
Patient sitting watching TV approximately 45 minutes ago and had sudden feeling like her heart was racing, then chest pressure with bilateral arm numbness. Patient reports a lot of stress in life right now. Took 1 Ativan PTA.

## 2019-03-15 NOTE — ED PROVIDER NOTES
History     Chief Complaint   Patient presents with     Chest Pain     HPI  Richmond Montanez is a 53 year old female who presents with sudden chest heaviness 10/10 and heart pounding with tingling to arms onset around 2045.  She recently was placed on Zoloft and Wellbutrin for increased stress post demise of her  12/2018 around [place].  Reports her  was 41yo and had a complete LAD occlusion.   She reports she took Alprazolam prior to arrival but denies symptoms being like an anxiety attack.   She had no associated nausea, diaphoresis, lightheadedness.  She immediately called her sister and he sister denies patient having slurred speech and when she arrived at the house 10 min later she did not have extremity weakness, no facial drooping, no slurred speech, was not pale nor diaphoretic.   Allergies:  Allergies   Allergen Reactions     Penicillins      Sulfa Drugs        Problem List:    Patient Active Problem List    Diagnosis Date Noted     Adjustment disorder with mixed anxiety and depressed mood 02/28/2019     Priority: Medium     Situational depression 01/10/2019     Priority: Medium     DIDI (generalized anxiety disorder) 01/10/2019     Priority: Medium     Menopausal syndrome (hot flashes) 01/10/2019     Priority: Medium     Other insomnia 01/10/2019     Priority: Medium     Chest tightness or pressure 01/10/2019     Priority: Medium     Deviated nasal septum 04/17/2008     Priority: Medium     Dyspnea and respiratory abnormality 04/17/2008     Priority: Medium     Problem list name updated by automated process. Provider to review       Personal history of tobacco use, presenting hazards to health 04/15/2002     Priority: Medium     Female infertility      Priority: Medium     Problem list name updated by automated process. Provider to review          Past Medical History:    Past Medical History:   Diagnosis Date     Female infertility of unspecified origin        Past Surgical History:    Past  Surgical History:   Procedure Laterality Date     HC REPAIR OF NASAL SEPTUM  04/22/08    Septoplasty. Resect inferior turbinates, Partial uvulectomy.       Family History:    Family History   Problem Relation Age of Onset     Arthritis Mother      Diabetes Mother         insulin type II     Thyroid Disease Mother         hypo     EYE* Mother         glaucoma     Lung Cancer Mother         d/c 78     Cancer Father         Cancer from asbestos exposure 1999 (Mesothelioma)     Hypertension Father      Kidney Disease Sister         Kidney transplant 2/2 kidney failure due to NSAID use     Alcohol/Drug No family hx of      Allergies No family hx of      Alzheimer Disease No family hx of      Anesthesia Reaction No family hx of      Blood Disease No family hx of      Depression No family hx of      Genetic Disorder No family hx of      Gastrointestinal Disease No family hx of      Genitourinary Problems No family hx of      Gynecology No family hx of      Heart Disease No family hx of      Lipids No family hx of      Neurologic Disorder No family hx of      Osteoporosis No family hx of      Psychotic Disorder No family hx of      Respiratory No family hx of      Cerebrovascular Disease No family hx of      Obesity No family hx of        Social History:  Marital Status:   [2]  Social History     Tobacco Use     Smoking status: Current Every Day Smoker     Packs/day: 1.00     Years: 15.00     Pack years: 15.00     Smokeless tobacco: Never Used   Substance Use Topics     Alcohol use: Yes     Comment: once per year     Drug use: No        Medications:      busPIRone (BUSPAR) 5 MG tablet   estradiol (ESTRACE) 0.5 MG tablet   hydrOXYzine (ATARAX) 25 MG tablet   medroxyPROGESTERone (PROVERA) 2.5 MG tablet   sertraline (ZOLOFT) 50 MG tablet         Review of Systems   Constitutional: Negative.    HENT: Negative.    Eyes: Negative for redness.   Respiratory: Positive for chest tightness. Negative for cough and shortness of  breath.    Cardiovascular: Positive for chest pain and palpitations. Negative for leg swelling.   Gastrointestinal: Negative for nausea and vomiting.   Musculoskeletal: Negative.    Skin: Negative.    Allergic/Immunologic: Negative for immunocompromised state.   Neurological: Negative for dizziness, syncope, facial asymmetry, speech difficulty, weakness, light-headedness, numbness and headaches.   Hematological: Negative.    Psychiatric/Behavioral: The patient is nervous/anxious.        Physical Exam   BP: 129/88  Pulse: 92  Heart Rate: 92  Temp: 97.5  F (36.4  C)  Resp: 20  Weight: 77.1 kg (170 lb)  SpO2: 98 %      Physical Exam   Constitutional: She is oriented to person, place, and time. She appears well-developed and well-nourished. She is active and cooperative.  Non-toxic appearance. She does not have a sickly appearance. She does not appear ill. No distress.   HENT:   Head: Normocephalic and atraumatic.   Mouth/Throat: Uvula is midline, oropharynx is clear and moist and mucous membranes are normal.   Eyes: Conjunctivae and EOM are normal. Pupils are equal, round, and reactive to light.   Neck: Normal range of motion and phonation normal. Neck supple. Carotid bruit is not present. No thyromegaly present.   Cardiovascular: Normal rate, regular rhythm, normal heart sounds and intact distal pulses.   No murmur heard.  Pulmonary/Chest: Breath sounds normal.   Abdominal: Soft. Bowel sounds are normal. She exhibits no distension. There is no tenderness.   Musculoskeletal: She exhibits no edema.   Neurological: She is alert and oriented to person, place, and time. She has normal strength. No cranial nerve deficit or sensory deficit. Gait normal. GCS eye subscore is 4. GCS verbal subscore is 5. GCS motor subscore is 6.   Skin: Skin is warm and dry. She is not diaphoretic.   Psychiatric: She has a normal mood and affect. Her behavior is normal.   Nursing note and vitals reviewed.      ED Course  Discussed with patient  will proceed with ACS rule out.  She currently has 1-2/10 pressure in chest. Will proceed with 3 baby aspirin.          ECG    Date/Time: 3/14/2019 11:54 PM  Performed by: Diana Bazzi APRN CNP  Authorized by: Diana Bazzi APRN CNP   Previous ECG: no previous ECG available  Rhythm: sinus rhythm  Rate: normal  QRS axis: normal  Conduction: conduction normal  ST Segments: ST segments normal  T Waves: T waves normal  Clinical impression: abnormal ECG and low voltage                       EKG Interpretation:      Interpreted by Diana Bazzi  Time reviewed: 0100  Symptoms at time of EKG: none   Rhythm: normal sinus  74  Rate: normal  Axis: normal  Ectopy: none  Conduction: normal  ST Segments/ T Waves: No ST-T wave changes  Q Waves: none  Comparison to prior: Unchanged    Clinical Impression: normal EKG          Critical Care time:  none               Results for orders placed or performed during the hospital encounter of 03/14/19 (from the past 24 hour(s))   CBC with platelets differential   Result Value Ref Range    WBC 11.5 (H) 4.0 - 11.0 10e9/L    RBC Count 4.79 3.8 - 5.2 10e12/L    Hemoglobin 13.9 11.7 - 15.7 g/dL    Hematocrit 41.0 35.0 - 47.0 %    MCV 86 78 - 100 fl    MCH 29.0 26.5 - 33.0 pg    MCHC 33.9 31.5 - 36.5 g/dL    RDW 13.5 10.0 - 15.0 %    Platelet Count 349 150 - 450 10e9/L    Diff Method Automated Method     % Neutrophils 57.3 %    % Lymphocytes 31.6 %    % Monocytes 5.9 %    % Eosinophils 4.4 %    % Basophils 0.5 %    % Immature Granulocytes 0.3 %    Nucleated RBCs 0 0 /100    Absolute Neutrophil 6.6 1.6 - 8.3 10e9/L    Absolute Lymphocytes 3.6 0.8 - 5.3 10e9/L    Absolute Monocytes 0.7 0.0 - 1.3 10e9/L    Absolute Basophils 0.1 0.0 - 0.2 10e9/L    Abs Immature Granulocytes 0.0 0 - 0.4 10e9/L    Absolute Nucleated RBC 0.0    Comprehensive metabolic panel   Result Value Ref Range    Sodium 142 133 - 144 mmol/L    Potassium 3.5 3.4 - 5.3 mmol/L    Chloride 110 (H) 94 -  109 mmol/L    Carbon Dioxide 22 20 - 32 mmol/L    Anion Gap 10 3 - 14 mmol/L    Glucose 105 (H) 70 - 99 mg/dL    Urea Nitrogen 23 7 - 30 mg/dL    Creatinine 0.80 0.52 - 1.04 mg/dL    GFR Estimate 83 >60 mL/min/[1.73_m2]    GFR Estimate If Black >90 >60 mL/min/[1.73_m2]    Calcium 8.3 (L) 8.5 - 10.1 mg/dL    Bilirubin Total 0.2 0.2 - 1.3 mg/dL    Albumin 3.8 3.4 - 5.0 g/dL    Protein Total 7.3 6.8 - 8.8 g/dL    Alkaline Phosphatase 112 40 - 150 U/L    ALT 21 0 - 50 U/L    AST 16 0 - 45 U/L   Lipase   Result Value Ref Range    Lipase 180 73 - 393 U/L   Troponin I   Result Value Ref Range    Troponin I ES <0.015 0.000 - 0.045 ug/L   TSH with free T4 reflex   Result Value Ref Range    TSH 3.51 0.40 - 4.00 mU/L   Magnesium   Result Value Ref Range    Magnesium 2.3 1.6 - 2.3 mg/dL   D dimer quantitative   Result Value Ref Range    D Dimer 0.3 0.0 - 0.50 ug/ml FEU   XR Chest 2 Views    Narrative    CHEST TWO VIEWS  3/14/2019 10:52 PM     HISTORY: Chest pain.    COMPARISON: 1/8/2019      Impression    IMPRESSION: Normal. No change.    MATT ZAMARRIPA MD   Troponin I   Result Value Ref Range    Troponin I ES <0.015 0.000 - 0.045 ug/L       Medications   aspirin (ASA) chewable tablet 324 mg (324 mg Oral Given 3/14/19 2310)       Assessments & Plan (with Medical Decision Making)  2352: Discussed with patient labs are stable without acute cause for chest pressure. D-dimer negative for clot, thyroid normal, and chest xray is also clear.  She has no further chest pressure and tingling in arms resolving.  Recommend repeating troponin at 0100 and second EKG. IF unremarkable recommend discharge with diligent follow up by her PCP.     0140: Discussed with patient negative second troponin and EKG. Recommend discussing with her PCP possible anxiety and eval of her medications. I have sent her PCP a message as well.      I have reviewed the nursing notes.    I have reviewed the findings, diagnosis, plan and need for follow up with the  patient.       HEART Score  Background  Calculates the overall risk of adverse event in patient's presenting with chest pain.  Based on 5 criteria (each assigned 0-2 points) including suspiciousness of history, EKG, age, risk factors and troponin.    Data  53 year old female  has Female infertility; Personal history of tobacco use, presenting hazards to health; Deviated nasal septum; Dyspnea and respiratory abnormality; Situational depression; DIDI (generalized anxiety disorder); Menopausal syndrome (hot flashes); Other insomnia; Chest tightness or pressure; and Adjustment disorder with mixed anxiety and depressed mood on their problem list.   reports that she has been smoking.  She has a 15.00 pack-year smoking history. she has never used smokeless tobacco.  family history includes Arthritis in her mother; Cancer in her father; Diabetes in her mother; EYE* in her mother; Hypertension in her father; Kidney Disease in her sister; Lung Cancer in her mother; Thyroid Disease in her mother.  Lab Results   Component Value Date    TROPI <0.015 03/15/2019     Criteria   0-2 points for each of 5 items (maximum of 10 points):  Score 0- History slightly suspicious for coronary syndrome  Score 0- EKG Normal  Score 1- Age 45 to 65 years old  Score 0- No risk factors for atherosclerotic disease  Score 0- Within normal limits for troponin levels  Interpretation  Risk of adverse outcome  Heart Score: 1  Total Score 0-3- Adverse Outcome Risk 2.5% - Supports early discharge with appropriate follow-up       Medication List      ASK your doctor about these medications    LORazepam 1 MG tablet  Commonly known as:  ATIVAN  0.5-1 mg, Oral, EVERY 6 HOURS PRN  Ask about: Should I take this medication?            Final diagnoses:   Chest discomfort       3/14/2019   [facility] EMERGENCY DEPARTMENT     Diana Bazzi, CLARI CNP  03/15/19 0141

## 2019-03-20 ENCOUNTER — TELEPHONE (OUTPATIENT)
Dept: FAMILY MEDICINE | Facility: CLINIC | Age: 54
End: 2019-03-20

## 2019-03-20 NOTE — TELEPHONE ENCOUNTER
Reason for Call:  Other     Detailed comments: Richmond is calling because she has some questions about her ED visit last week and some of her medications.  Richmond states she thought the ED provider was going to reach out to Dr Fonseca regarding these issues but she has not heard from anyone yet.    Richmond is now aware that Dr Fonseca is out of clinic until 04/01 and is hoping a nurse or another provider would get back to her.    Phone Number Patient can be reached at: Home number on file [phone] (home)    Best Time: any    Can we leave a detailed message on this number? YES    Call taken on 3/20/2019 at 4:14 PM by Beata Troy

## 2019-03-22 ENCOUNTER — OFFICE VISIT (OUTPATIENT)
Dept: FAMILY MEDICINE | Facility: OTHER | Age: 54
End: 2019-03-22
Payer: COMMERCIAL

## 2019-03-22 VITALS
DIASTOLIC BLOOD PRESSURE: 68 MMHG | RESPIRATION RATE: 20 BRPM | HEART RATE: 76 BPM | TEMPERATURE: 97.1 F | BODY MASS INDEX: 26.28 KG/M2 | SYSTOLIC BLOOD PRESSURE: 104 MMHG | WEIGHT: 174.4 LBS

## 2019-03-22 DIAGNOSIS — G47.09 OTHER INSOMNIA: ICD-10-CM

## 2019-03-22 DIAGNOSIS — F41.1 GAD (GENERALIZED ANXIETY DISORDER): Primary | ICD-10-CM

## 2019-03-22 PROCEDURE — 99213 OFFICE O/P EST LOW 20 MIN: CPT | Performed by: PHYSICIAN ASSISTANT

## 2019-03-22 RX ORDER — HYDROXYZINE HYDROCHLORIDE 25 MG/1
TABLET, FILM COATED ORAL
Qty: 90 TABLET | Refills: 5 | Status: SHIPPED | OUTPATIENT
Start: 2019-03-22 | End: 2021-02-01

## 2019-03-22 ASSESSMENT — PAIN SCALES - GENERAL: PAINLEVEL: NO PAIN (0)

## 2019-03-22 NOTE — PATIENT INSTRUCTIONS
Take 2 tablets (100mg) of Sertraline (zoloft) daily. Follow up in 4-6 weeks.    Take hydroxyzine 1-2 tablets (25-50mg) three times daily as needed for anxiety and you may continue to use one of the three times for sleep.    If you are find that this treatment is not effective call the clinic and let us.

## 2019-03-22 NOTE — LETTER
My Depression Action Plan  Name: Richmond Montanez   Date of Birth 1965  Date: 3/22/2019    My doctor: Fernando Fonseca   My clinic: [place] [address] [facility] [address] [phone]          GREEN    ZONE   Good Control    What it looks like:     Things are going generally well. You have normal up s and down s. You may even feel depressed from time to time, but bad moods usually last less than a day.   What you need to do:  1. Continue to care for yourself (see self care plan)  2. Check your depression survival kit and update it as needed  3. Follow your physician s recommendations including any medication.  4. Do not stop taking medication unless you consult with your physician first.           YELLOW         ZONE Getting Worse    What it looks like:     Depression is starting to interfere with your life.     It may be hard to get out of bed; you may be starting to isolate yourself from others.    Symptoms of depression are starting to last most all day and this has happened for several days.     You may have suicidal thoughts but they are not constant.   What you need to do:     1. Call your care team, your response to treatment will improve if you keep your care team informed of your progress. Yellow periods are signs an adjustment may need to be made.     2. Continue your self-care, even if you have to fake it!    3. Talk to someone in your support network    4. Open up your depression survival kit           RED    ZONE Medical Alert - Get Help    What it looks like:     Depression is seriously interfering with your life.     You may experience these or other symptoms: You can t get out of bed most days, can t work or engage in other necessary activities, you have trouble taking care of basic hygiene, or basic responsibilities, thoughts of suicide or death that will not go away, self-injurious behavior.     What you need to do:  1. Call your care team and request a  same-day appointment. If they are not available (weekends or after hours) call your local crisis line, emergency room or 911.            Depression Self Care Plan / Survival Kit    Self-Care for Depression  Here s the deal. Your body and mind are really not as separate as most people think.  What you do and think affects how you feel and how you feel influences what you do and think. This means if you do things that people who feel good do, it will help you feel better.  Sometimes this is all it takes.  There is also a place for medication and therapy depending on how severe your depression is, so be sure to consult with your medical provider and/ or Behavioral Health Consultant if your symptoms are worsening or not improving.     In order to better manage my stress, I will:    Exercise  Get some form of exercise, every day. This will help reduce pain and release endorphins, the  feel good  chemicals in your brain. This is almost as good as taking antidepressants!  This is not the same as joining a gym and then never going! (they count on that by the way ) It can be as simple as just going for a walk or doing some gardening, anything that will get you moving.      Hygiene   Maintain good hygiene (Get out of bed in the morning, Make your bed, Brush your teeth, Take a shower, and Get dressed like you were going to work, even if you are unemployed).  If your clothes don't fit try to get ones that do.    Diet  I will strive to eat foods that are good for me, drink plenty of water, and avoid excessive sugar, caffeine, alcohol, and other mood-altering substances.  Some foods that are helpful in depression are: complex carbohydrates, B vitamins, flaxseed, fish or fish oil, fresh fruits and vegetables.    Psychotherapy  I agree to participate in Individual Therapy (if recommended).    Medication  If prescribed medications, I agree to take them.  Missing doses can result in serious side effects.  I understand that drinking  alcohol, or other illicit drug use, may cause potential side effects.  I will not stop my medication abruptly without first discussing it with my provider.    Staying Connected With Others  I will stay in touch with my friends, family members, and my primary care provider/team.    Use your imagination  Be creative.  We all have a creative side; it doesn t matter if it s oil painting, sand castles, or mud pies! This will also kick up the endorphins.    Witness Beauty  (AKA stop and smell the roses) Take a look outside, even in mid-winter. Notice colors, textures. Watch the squirrels and birds.     Service to others  Be of service to others.  There is always someone else in need.  By helping others we can  get out of ourselves  and remember the really important things.  This also provides opportunities for practicing all the other parts of the program.    Humor  Laugh and be silly!  Adjust your TV habits for less news and crime-drama and more comedy.    Control your stress  Try breathing deep, massage therapy, biofeedback, and meditation. Find time to relax each day.     My support system    Clinic Contact:  Phone number:    Contact 1:  Phone number:    Contact 2:  Phone number:    Christianity/:  Phone number:    Therapist:  Phone number:    Local crisis center:    Phone number:    Other community support:  Phone number:

## 2019-03-22 NOTE — NURSING NOTE
Health Maintenance Due   Topic Date Due     DEPRESSION ACTION PLAN  08/12/1983     COLON CANCER SCREEN (SYSTEM ASSIGNED)  08/12/2015     ZOSTER IMMUNIZATION (1 of 2) 08/12/2015     PREVENTIVE CARE VISIT  04/26/2018     INFLUENZA VACCINE (1) 09/01/2018     Karine LINN LPN

## 2019-03-22 NOTE — PROGRESS NOTES
SUBJECTIVE:   Richmond Montanez is a 53 year old female who presents to clinic today for the following health issues:      ED/UC Followup:    Facility:  [facility] Emergency room  Date of visit: 3-  Reason for visit: chest discomfort  Current Status: good relief         Patient is a 53 year old female who presents for follow up of recent ED visit. She was seen on 03/14/2019 for chest discomfort. Serial troponin and EKG both returned normal as well as remaining labs. Patient was discharged with diagnosis of anxiety. Today she says that she does experience anxiety and her PCP had started her on zoloft and recently added Buspar, but stopped due to adverse effects. Today we discussed increasing zoloft to help with anxiety.     2352: Discussed with patient labs are stable without acute cause for chest pressure. D-dimer negative for clot, thyroid normal, and chest xray is also clear.  She has no further chest pressure and tingling in arms resolving.  Recommend repeating troponin at 0100 and second EKG. IF unremarkable recommend discharge with diligent follow up by her PCP.      0140: Discussed with patient negative second troponin and EKG. Recommend discussing with her PCP possible anxiety and eval of her medications. I have sent her PCP a message as well.     Problem list and histories reviewed & adjusted, as indicated.  Additional history: as documented    Reviewed and updated as needed this visit by clinical staff  Tobacco  Allergies  Meds  Med Hx  Surg Hx  Fam Hx  Soc Hx      Reviewed and updated as needed this visit by Provider         ROS:  Constitutional, HEENT, cardiovascular, pulmonary, gi and gu systems are negative, except as otherwise noted.    OBJECTIVE:     /68 (BP Location: Right arm, Patient Position: Chair, Cuff Size: Adult Large)   Pulse 76   Temp 97.1  F (36.2  C) (Oral)   Resp 20   Wt 79.1 kg (174 lb 6.4 oz)   LMP 05/12/2014 (Approximate)   BMI 26.28 kg/m    Body mass  index is 26.28 kg/m .  GENERAL: healthy, alert and no distress  RESP: lungs clear to auscultation - no rales, rhonchi or wheezes  CV: regular rate and rhythm, normal S1 S2, no S3 or S4, no murmur, click or rub, no peripheral edema and peripheral pulses strong  MS: no gross musculoskeletal defects noted, no edema  NEURO: Normal strength and tone, mentation intact and speech normal  PSYCH: mentation appears normal, affect normal/bright    Diagnostic Test Results:  none     ASSESSMENT/PLAN:     1. DIDI (generalized anxiety disorder)  Patient will increase zoloft to 100mg daily and follow up with PCP in 4 weeks. Discussed realistic timeline to benefits and possible adverse effects. Patient may have hydroxyzine to use up to three times daily for anxiety. Cautioned patient about sedative effects and advised not to drive.     2. Other insomnia    - hydrOXYzine (ATARAX) 25 MG tablet; Take 1-2 tablets (25mg - 50mg) up to three times daily for anxiety  Dispense: 90 tablet; Refill: 5    Follow up with clinic 4 weeks or sooner if conditions change, worsen or fail to improve as expected.      Douglas Dhaliwal PA-C  [facility]

## 2019-04-01 ENCOUNTER — TELEPHONE (OUTPATIENT)
Dept: FAMILY MEDICINE | Facility: CLINIC | Age: 54
End: 2019-04-01

## 2019-04-01 NOTE — TELEPHONE ENCOUNTER
----- Message from Fernando Fonseca MD sent at 4/1/2019  1:19 PM CDT -----  Regarding: FW: ER Visit 3/14/2019  Can someone call Richmond and see how she is doing.  I got this message directly to me after she was seen in the ED and obviously I was gone for the past 2 weeks so it was not addressed.  She still having chest discomfort or tingling in her arms and is she still taking both medications?  Neither of these medications typically would cause the symptoms to start so I am guessing it may be more of her anxiety than anything else.    Electronically signed by:  Fernando Fonseca M.D.  4/1/2019    ----- Message -----  From: Diana Bazzi APRN CNP  Sent: 3/15/2019   1:35 AM  To: Fernando Fonseca MD  Subject: ER Visit 3/14/2019                               Domingo. I saw Richmond in the ER for chest discomfort and tingling in arms.  Her troponin and EKG were negative x2 and no acute cause for chest discomfort. ?if Buspar and Zoloft mix as cause of symptoms.  Patient would like a call back regarding possible medication change.     Regards,  CLARI [place]

## 2019-04-01 NOTE — TELEPHONE ENCOUNTER
LM asking patient to return our call.  Please see provider message below.  Jing Lazar, [facility]

## 2019-04-02 NOTE — TELEPHONE ENCOUNTER
Quit taking the Buspar.  No sx of the chest discomfort or the tingling in her arms anymore, she did f/u with Dr. Dhaliwal and he increased Zoloft and said not to take the Buspar.  Said to double but she is only taking a pill and a half.  Nothing further at this time.  She is scheduled for a follow up with PCP on 05/08/19.  Jing Lazar, CMA

## 2019-05-08 ENCOUNTER — OFFICE VISIT (OUTPATIENT)
Dept: FAMILY MEDICINE | Facility: CLINIC | Age: 54
End: 2019-05-08
Payer: COMMERCIAL

## 2019-05-08 VITALS — TEMPERATURE: 97.9 F | WEIGHT: 173 LBS | BODY MASS INDEX: 26.07 KG/M2 | OXYGEN SATURATION: 98 %

## 2019-05-08 DIAGNOSIS — F43.21 SITUATIONAL DEPRESSION: Primary | ICD-10-CM

## 2019-05-08 DIAGNOSIS — G47.09 OTHER INSOMNIA: ICD-10-CM

## 2019-05-08 DIAGNOSIS — F41.1 GAD (GENERALIZED ANXIETY DISORDER): ICD-10-CM

## 2019-05-08 PROCEDURE — 99214 OFFICE O/P EST MOD 30 MIN: CPT | Performed by: FAMILY MEDICINE

## 2019-05-08 ASSESSMENT — ANXIETY QUESTIONNAIRES
6. BECOMING EASILY ANNOYED OR IRRITABLE: SEVERAL DAYS
3. WORRYING TOO MUCH ABOUT DIFFERENT THINGS: SEVERAL DAYS
2. NOT BEING ABLE TO STOP OR CONTROL WORRYING: SEVERAL DAYS
1. FEELING NERVOUS, ANXIOUS, OR ON EDGE: SEVERAL DAYS
7. FEELING AFRAID AS IF SOMETHING AWFUL MIGHT HAPPEN: NOT AT ALL
IF YOU CHECKED OFF ANY PROBLEMS ON THIS QUESTIONNAIRE, HOW DIFFICULT HAVE THESE PROBLEMS MADE IT FOR YOU TO DO YOUR WORK, TAKE CARE OF THINGS AT HOME, OR GET ALONG WITH OTHER PEOPLE: NOT DIFFICULT AT ALL

## 2019-05-08 ASSESSMENT — PATIENT HEALTH QUESTIONNAIRE - PHQ9
5. POOR APPETITE OR OVEREATING: NOT AT ALL
SUM OF ALL RESPONSES TO PHQ QUESTIONS 1-9: 9

## 2019-05-08 ASSESSMENT — PAIN SCALES - GENERAL: PAINLEVEL: NO PAIN (0)

## 2019-05-08 NOTE — PROGRESS NOTES
"  SUBJECTIVE:   Richmond Montanez is a 53 year old female who presents to clinic today for the following   health issues:    Depression Followup    Status since last visit: Stable     See PHQ-9 for current symptoms.  Other associated symptoms: None    Complicating factors:   Significant life event:  Yes-   passed   Current substance abuse:  None  Anxiety or Panic symptoms:  Yes-  But too bad    PHQ 2/5/2019 2/28/2019 5/8/2019   PHQ-9 Total Score 17 15 9   Q9: Thoughts of better off dead/self-harm past 2 weeks Not at all Not at all Not at all     DIDI-7 SCORE 1/10/2019 2/5/2019 2/28/2019   Total Score 15 15 9     Patient has a history of anxiety and depression. This started after her  passed away in December due to a heart attack. Since last visit, patient had presented to the ER with chest pain and heart pounding symptoms which were thought to be due to anxiety. Had seen Douglas Dhaliwal for follow up and had increased her zoloft dose from 50 mg to 100 mg daily. Richmond reports that she has been stable since then, no further anxiety attacks. She did increase her dose to 75 mg daily, but stated she felt like a zombie on it, so she is back down to 50 mg. Biggest issue for her is the depression and feeling down, although this has gotten a little better as she is now going back to work and has something to fill her day. States she has a lot of yard work to do, and she just wants to stay in her recliner and sleep, putting it off for the next day as she does not have motivation. Also takes 2 hydroxyzine every night to help her sleep. No thoughts of harming herself. Has previously done some grief therapy, which she does not wish to do at this time, but would like to in the future as the experience is still \"raw\" and hurts to talk about at this time.      PHQ-9  English  PHQ-9   Any Language  Suicide Assessment Five-step Evaluation and Treatment (SAFE-T)    Amount of exercise or physical activity: None    Problems " taking medications regularly: No    Medication side effects: none    Diet: regular (no restrictions)      Additional history: as documented    Reviewed  and updated as needed this visit by clinical staff  Tobacco  Allergies  Meds         Reviewed and updated as needed this visit by Provider         Patient Active Problem List   Diagnosis     Female infertility     Personal history of tobacco use, presenting hazards to health     Deviated nasal septum     Dyspnea and respiratory abnormality     Situational depression     DIDI (generalized anxiety disorder)     Menopausal syndrome (hot flashes)     Other insomnia     Chest tightness or pressure     Adjustment disorder with mixed anxiety and depressed mood     Past Surgical History:   Procedure Laterality Date     HC REPAIR OF NASAL SEPTUM  04/22/08    Septoplasty. Resect inferior turbinates, Partial uvulectomy.       Social History     Tobacco Use     Smoking status: Current Every Day Smoker     Packs/day: 1.00     Years: 15.00     Pack years: 15.00     Smokeless tobacco: Never Used   Substance Use Topics     Alcohol use: Yes     Comment: once per year     Family History   Problem Relation Age of Onset     Arthritis Mother      Diabetes Mother         insulin type II     Thyroid Disease Mother         hypo     EYE* Mother         glaucoma     Lung Cancer Mother         d/c 78     Cancer Father         Cancer from asbestos exposure 1999 (Mesothelioma)     Hypertension Father      Kidney Disease Sister         Kidney transplant 2/2 kidney failure due to NSAID use     Alcohol/Drug No family hx of      Allergies No family hx of      Alzheimer Disease No family hx of      Anesthesia Reaction No family hx of      Blood Disease No family hx of      Depression No family hx of      Genetic Disorder No family hx of      Gastrointestinal Disease No family hx of      Genitourinary Problems No family hx of      Gynecology No family hx of      Heart Disease No family hx of       Lipids No family hx of      Neurologic Disorder No family hx of      Osteoporosis No family hx of      Psychotic Disorder No family hx of      Respiratory No family hx of      Cerebrovascular Disease No family hx of      Obesity No family hx of          Current Outpatient Medications   Medication Sig Dispense Refill     estradiol (ESTRACE) 0.5 MG tablet Take 1 tablet (0.5 mg) by mouth daily 90 tablet 3     hydrOXYzine (ATARAX) 25 MG tablet Take 1-2 tablets (25mg - 50mg) up to three times daily for anxiety 90 tablet 5     medroxyPROGESTERone (PROVERA) 2.5 MG tablet Take 1 tablet (2.5 mg) by mouth daily 90 tablet 3     sertraline (ZOLOFT) 50 MG tablet Take 1 tablet (50 mg) by mouth daily 90 tablet 3       ROS:  Constitutional, HEENT, cardiovascular, pulmonary, gi and gu systems are negative, except as otherwise noted.    OBJECTIVE:     BP (P) 106/68   Pulse (P) 76   Temp 97.9  F (36.6  C) (Temporal)   Resp (P) 18   Wt 78.5 kg (173 lb)   LMP 05/12/2014 (Approximate)   SpO2 98%   BMI 26.07 kg/m    Body mass index is 26.07 kg/m .  GENERAL: healthy, alert and no distress  RESP: lungs clear to auscultation - no rales, rhonchi or wheezes  CV: regular rate and rhythm, normal S1 S2, no S3 or S4, no murmur, click or rub, no peripheral edema and peripheral pulses strong  MS: no gross musculoskeletal defects noted, no edema  SKIN: no suspicious lesions or rashes  NEURO: Mentation intact and speech normal  PSYCH: mentation appears normal, affect appears slightly depressed. She will laugh and joke at times.    Diagnostic Test Results:  none     ASSESSMENT/PLAN:     (F43.21) Situational depression  (primary encounter diagnosis)  Comment: Has been depressed and down since the death of her  last December. She appears to be gradually getting better, and has been stable on 50 mg of Zoloft. Do not think a medication change is needed today. Discussed how her depressed mood and lack of motivation can be a normal part of the  grieving process. GAD7 score improved from 9 -> 4, and PHQ9 score improved from 15 -> 9. Discussed how going back to work is going. When she is off work, discussed making a schedule, so she is making herself do one small productive thing a day, as this will help her feel better that day, rather than sitting and sleeping in her arm chair.   Plan: No medication changes today, will follow up in 3-4 months.    (F41.1) DIDI (generalized anxiety disorder)  (G47.09) Other insomnia  Comment: Patient reports no panic attacks since her ER visit in March. Has been on zoloft 50 mg and takes 2 hydroxyzine at night to help her sleep. Reports this has been stable. GAD7 score has improved from 9->4.  Plan: No medication changes today.      Follow up in 3-4 months for anxiety/depression recheck.     Patient was seen and examined by myself and Dr. Fonseca. The note was then scribed by me.     Molly Dixon, MS3  May 8, 2019    I agree with the PFSH and ROS as completed by the MS.  The remainder of the encounter was performed by me and scribed by the MS.  The scribed note accurately reflects my personal services and the decisions made by me.     Electronically signed by:  Fernando Fonseca M.D.  5/8/2019

## 2021-02-01 ENCOUNTER — OFFICE VISIT (OUTPATIENT)
Dept: FAMILY MEDICINE | Facility: CLINIC | Age: 56
End: 2021-02-01
Payer: COMMERCIAL

## 2021-02-01 VITALS
OXYGEN SATURATION: 96 % | HEIGHT: 69 IN | TEMPERATURE: 97.4 F | DIASTOLIC BLOOD PRESSURE: 66 MMHG | SYSTOLIC BLOOD PRESSURE: 138 MMHG | RESPIRATION RATE: 16 BRPM | BODY MASS INDEX: 24.73 KG/M2 | WEIGHT: 167 LBS | HEART RATE: 99 BPM

## 2021-02-01 DIAGNOSIS — R07.89 CHEST PRESSURE: ICD-10-CM

## 2021-02-01 DIAGNOSIS — Z00.00 ROUTINE GENERAL MEDICAL EXAMINATION AT A HEALTH CARE FACILITY: Primary | ICD-10-CM

## 2021-02-01 DIAGNOSIS — Z12.11 SPECIAL SCREENING FOR MALIGNANT NEOPLASM OF COLON: ICD-10-CM

## 2021-02-01 DIAGNOSIS — R73.09 ELEVATED GLUCOSE: ICD-10-CM

## 2021-02-01 DIAGNOSIS — R35.1 NOCTURIA: ICD-10-CM

## 2021-02-01 DIAGNOSIS — Z12.31 VISIT FOR SCREENING MAMMOGRAM: ICD-10-CM

## 2021-02-01 DIAGNOSIS — N39.0 URINARY TRACT INFECTION WITHOUT HEMATURIA, SITE UNSPECIFIED: ICD-10-CM

## 2021-02-01 DIAGNOSIS — Z23 NEED FOR VACCINATION: ICD-10-CM

## 2021-02-01 LAB
ALBUMIN UR-MCNC: NEGATIVE MG/DL
APPEARANCE UR: ABNORMAL
BACTERIA #/AREA URNS HPF: ABNORMAL /HPF
BILIRUB UR QL STRIP: NEGATIVE
COLOR UR AUTO: YELLOW
GLUCOSE SERPL-MCNC: 95 MG/DL (ref 70–99)
GLUCOSE UR STRIP-MCNC: NEGATIVE MG/DL
HGB UR QL STRIP: ABNORMAL
KETONES UR STRIP-MCNC: NEGATIVE MG/DL
LEUKOCYTE ESTERASE UR QL STRIP: ABNORMAL
MUCOUS THREADS #/AREA URNS LPF: PRESENT /LPF
NITRATE UR QL: POSITIVE
PH UR STRIP: 5 PH (ref 5–7)
RBC #/AREA URNS AUTO: 5 /HPF (ref 0–2)
SOURCE: ABNORMAL
SP GR UR STRIP: 1.02 (ref 1–1.03)
SQUAMOUS #/AREA URNS AUTO: 1 /HPF (ref 0–1)
UROBILINOGEN UR STRIP-MCNC: 0 MG/DL (ref 0–2)
WBC #/AREA URNS AUTO: 26 /HPF (ref 0–5)

## 2021-02-01 PROCEDURE — 90471 IMMUNIZATION ADMIN: CPT | Performed by: FAMILY MEDICINE

## 2021-02-01 PROCEDURE — 99213 OFFICE O/P EST LOW 20 MIN: CPT | Mod: 25 | Performed by: FAMILY MEDICINE

## 2021-02-01 PROCEDURE — 90472 IMMUNIZATION ADMIN EACH ADD: CPT | Performed by: FAMILY MEDICINE

## 2021-02-01 PROCEDURE — 99396 PREV VISIT EST AGE 40-64: CPT | Mod: 25 | Performed by: FAMILY MEDICINE

## 2021-02-01 PROCEDURE — 87086 URINE CULTURE/COLONY COUNT: CPT | Performed by: FAMILY MEDICINE

## 2021-02-01 PROCEDURE — 87186 SC STD MICRODIL/AGAR DIL: CPT | Performed by: FAMILY MEDICINE

## 2021-02-01 PROCEDURE — 82947 ASSAY GLUCOSE BLOOD QUANT: CPT | Performed by: FAMILY MEDICINE

## 2021-02-01 PROCEDURE — 81001 URINALYSIS AUTO W/SCOPE: CPT | Performed by: FAMILY MEDICINE

## 2021-02-01 PROCEDURE — 90750 HZV VACC RECOMBINANT IM: CPT | Performed by: FAMILY MEDICINE

## 2021-02-01 PROCEDURE — 36415 COLL VENOUS BLD VENIPUNCTURE: CPT | Performed by: FAMILY MEDICINE

## 2021-02-01 PROCEDURE — 90732 PPSV23 VACC 2 YRS+ SUBQ/IM: CPT | Performed by: FAMILY MEDICINE

## 2021-02-01 PROCEDURE — 87088 URINE BACTERIA CULTURE: CPT | Performed by: FAMILY MEDICINE

## 2021-02-01 RX ORDER — NITROFURANTOIN 25; 75 MG/1; MG/1
100 CAPSULE ORAL 2 TIMES DAILY
Qty: 20 CAPSULE | Refills: 0 | Status: SHIPPED | OUTPATIENT
Start: 2021-02-01 | End: 2021-02-11

## 2021-02-01 SDOH — SOCIAL STABILITY: SOCIAL NETWORK: HOW OFTEN DO YOU GET TOGETHER WITH FRIENDS OR RELATIVES?: MORE THAN THREE TIMES A WEEK

## 2021-02-01 SDOH — SOCIAL STABILITY: SOCIAL INSECURITY
WITHIN THE LAST YEAR, HAVE YOU BEEN HUMILIATED OR EMOTIONALLY ABUSED IN OTHER WAYS BY YOUR PARTNER OR EX-PARTNER?: NOT ASKED

## 2021-02-01 SDOH — HEALTH STABILITY: MENTAL HEALTH: HOW OFTEN DO YOU HAVE SIX OR MORE DRINKS ON ONE OCCASION?: NEVER

## 2021-02-01 SDOH — HEALTH STABILITY: MENTAL HEALTH: HOW MANY DRINKS CONTAINING ALCOHOL DO YOU HAVE ON A TYPICAL DAY WHEN YOU ARE DRINKING?: 1 OR 2

## 2021-02-01 SDOH — SOCIAL STABILITY: SOCIAL NETWORK: HOW OFTEN DO YOU ATTEND CHURCH OR RELIGIOUS SERVICES?: NEVER

## 2021-02-01 SDOH — ECONOMIC STABILITY: FOOD INSECURITY: WITHIN THE PAST 12 MONTHS, YOU WORRIED THAT YOUR FOOD WOULD RUN OUT BEFORE YOU GOT THE MONEY TO BUY MORE.: NOT ASKED

## 2021-02-01 SDOH — ECONOMIC STABILITY: FOOD INSECURITY: HOW HARD IS IT FOR YOU TO PAY FOR THE VERY BASICS LIKE FOOD, HOUSING, MEDICAL CARE, AND HEATING?: NOT ASKED

## 2021-02-01 SDOH — SOCIAL STABILITY: SOCIAL INSECURITY
WITHIN THE LAST YEAR, HAVE TO BEEN RAPED OR FORCED TO HAVE ANY KIND OF SEXUAL ACTIVITY BY YOUR PARTNER OR EX-PARTNER?: NOT ASKED

## 2021-02-01 SDOH — HEALTH STABILITY: MENTAL HEALTH: HOW OFTEN DO YOU HAVE 6 OR MORE DRINKS ON ONE OCCASION?: NEVER

## 2021-02-01 SDOH — ECONOMIC STABILITY: TRANSPORTATION INSECURITY
IN THE PAST 12 MONTHS, HAS LACK OF TRANSPORTATION KEPT YOU FROM MEDICAL APPOINTMENTS OR FROM GETTING MEDICATIONS?: NOT ASKED

## 2021-02-01 SDOH — SOCIAL STABILITY: SOCIAL NETWORK: HOW OFTEN DO YOU ATTENT MEETINGS OF THE CLUB OR ORGANIZATION YOU BELONG TO?: NEVER

## 2021-02-01 SDOH — SOCIAL STABILITY: SOCIAL NETWORK
IN A TYPICAL WEEK, HOW MANY TIMES DO YOU TALK ON THE PHONE WITH FAMILY, FRIENDS, OR NEIGHBORS?: MORE THAN THREE TIMES A WEEK

## 2021-02-01 SDOH — ECONOMIC STABILITY: FOOD INSECURITY: WITHIN THE PAST 12 MONTHS, THE FOOD YOU BOUGHT JUST DIDN'T LAST AND YOU DIDN'T HAVE MONEY TO GET MORE.: NOT ASKED

## 2021-02-01 SDOH — HEALTH STABILITY: PHYSICAL HEALTH: ON AVERAGE, HOW MANY DAYS PER WEEK DO YOU ENGAGE IN MODERATE TO STRENUOUS EXERCISE (LIKE A BRISK WALK)?: 0 DAYS

## 2021-02-01 SDOH — SOCIAL STABILITY: SOCIAL INSECURITY
WITHIN THE LAST YEAR, HAVE YOU BEEN KICKED, HIT, SLAPPED, OR OTHERWISE PHYSICALLY HURT BY YOUR PARTNER OR EX-PARTNER?: NOT ASKED

## 2021-02-01 SDOH — ECONOMIC STABILITY: TRANSPORTATION INSECURITY
IN THE PAST 12 MONTHS, HAS LACK OF TRANSPORTATION KEPT YOU FROM MEETINGS, WORK, OR FROM GETTING THINGS NEEDED FOR DAILY LIVING?: NOT ASKED

## 2021-02-01 SDOH — SOCIAL STABILITY: SOCIAL INSECURITY: WITHIN THE LAST YEAR, HAVE YOU BEEN AFRAID OF YOUR PARTNER OR EX-PARTNER?: NOT ASKED

## 2021-02-01 SDOH — ECONOMIC STABILITY: FOOD INSECURITY: WITHIN THE PAST 12 MONTHS, YOU WORRIED THAT YOUR FOOD WOULD RUN OUT BEFORE YOU GOT MONEY TO BUY MORE.: NOT ASKED

## 2021-02-01 SDOH — HEALTH STABILITY: PHYSICAL HEALTH: ON AVERAGE, HOW MANY MINUTES DO YOU ENGAGE IN EXERCISE AT THIS LEVEL?: 0 MIN

## 2021-02-01 SDOH — SOCIAL STABILITY: SOCIAL NETWORK
DO YOU BELONG TO ANY CLUBS OR ORGANIZATIONS SUCH AS CHURCH GROUPS UNIONS, FRATERNAL OR ATHLETIC GROUPS, OR SCHOOL GROUPS?: NO

## 2021-02-01 SDOH — HEALTH STABILITY: MENTAL HEALTH: HOW MANY STANDARD DRINKS CONTAINING ALCOHOL DO YOU HAVE ON A TYPICAL DAY?: 1 OR 2

## 2021-02-01 SDOH — SOCIAL STABILITY: SOCIAL NETWORK
DO YOU BELONG TO ANY CLUBS OR ORGANIZATIONS SUCH AS CHURCH GROUPS, UNIONS, FRATERNAL OR ATHLETIC GROUPS, OR SCHOOL GROUPS?: NO

## 2021-02-01 SDOH — HEALTH STABILITY: MENTAL HEALTH
STRESS IS WHEN SOMEONE FEELS TENSE, NERVOUS, ANXIOUS, OR CAN'T SLEEP AT NIGHT BECAUSE THEIR MIND IS TROUBLED. HOW STRESSED ARE YOU?: TO SOME EXTENT

## 2021-02-01 SDOH — SOCIAL STABILITY: SOCIAL NETWORK: HOW OFTEN DO YOU ATTEND MEETINGS OF THE CLUBS OR ORGANIZATIONS YOU BELONG TO?: NEVER

## 2021-02-01 SDOH — HEALTH STABILITY: MENTAL HEALTH: HOW OFTEN DO YOU HAVE A DRINK CONTAINING ALCOHOL?: MONTHLY OR LESS

## 2021-02-01 SDOH — SOCIAL STABILITY: SOCIAL NETWORK: ARE YOU MARRIED, WIDOWED, DIVORCED, SEPARATED, NEVER MARRIED, OR LIVING WITH A PARTNER?: WIDOWED

## 2021-02-01 SDOH — ECONOMIC STABILITY: TRANSPORTATION INSECURITY
IN THE PAST 12 MONTHS, HAS THE LACK OF TRANSPORTATION KEPT YOU FROM MEDICAL APPOINTMENTS OR FROM GETTING MEDICATIONS?: NOT ASKED

## 2021-02-01 SDOH — SOCIAL STABILITY: SOCIAL INSECURITY: ARE YOU MARRIED, WIDOWED, DIVORCED, SEPARATED, NEVER MARRIED, OR LIVING WITH A PARTNER?: WIDOWED

## 2021-02-01 SDOH — ECONOMIC STABILITY: INCOME INSECURITY: HOW HARD IS IT FOR YOU TO PAY FOR THE VERY BASICS LIKE FOOD, HOUSING, MEDICAL CARE, AND HEATING?: NOT ASKED

## 2021-02-01 SDOH — HEALTH STABILITY: MENTAL HEALTH
DO YOU FEEL STRESS - TENSE, RESTLESS, NERVOUS, OR ANXIOUS, OR UNABLE TO SLEEP AT NIGHT BECAUSE YOUR MIND IS TROUBLED ALL THE TIME - THESE DAYS?: TO SOME EXTENT

## 2021-02-01 SDOH — SOCIAL STABILITY: SOCIAL INSECURITY
WITHIN THE LAST YEAR, HAVE YOU BEEN RAPED OR FORCED TO HAVE ANY KIND OF SEXUAL ACTIVITY BY YOUR PARTNER OR EX-PARTNER?: NOT ASKED

## 2021-02-01 ASSESSMENT — ACTIVITIES OF DAILY LIVING (ADL): LACK_OF_TRANSPORTATION: NOT ASKED

## 2021-02-01 ASSESSMENT — PAIN SCALES - GENERAL: PAINLEVEL: NO PAIN (0)

## 2021-02-01 ASSESSMENT — MIFFLIN-ST. JEOR: SCORE: 1408.95

## 2021-02-01 NOTE — PROGRESS NOTES
SUBJECTIVE:   CC: Richmond Montanez is an 55 year old woman who presents for preventive health visit.    Patient has been advised of split billing requirements and indicates understanding: Yes  Healthy Habits:    Do you get at least three servings of calcium containing foods daily (dairy, green leafy vegetables, etc.)? no, taking calcium and/or vitamin D supplement: no    Amount of exercise or daily activities, outside of work: 0 day(s) per week    Problems taking medications regularly No    Medication side effects: No    Have you had an eye exam in the past two years? no    Do you see a dentist twice per year? yes    Do you have sleep apnea, excessive snoring or daytime drowsiness?no      PROBLEMS TO ADD ON...  Chest pressure in the lower breast bone region in the evenings an hour or so after eating, sometimes she gets sweaty and has felt dizzy.  She said this seldom happens at work but it did happen the other day when she got this dizzy feeling and had the sweats.  She does not get symptoms of heartburn or acid indigestion when she lays down at night.  It is interesting though that most of her symptoms will start about an hour after she eats something.  There is a strong family history of heart disease and diabetes.  She does have a 30+ pack year history of smoking.  Her anxiety has been much better since her  .  It was the second anniversary of his death on .  Her daughter lives with her full-time now which has helped.    The patient has not had a mammogram recently nor has she had her colonoscopy.  She is not certain she wants to do a colonoscopy and would like to discuss the other options including Cologuard and fecal occult blood testing.  She decided on the Cologuard screening.    She is interested in getting the shingles vaccine since and the pneumococcal vaccine due to her smoking.  She is declining a flu vaccine.    She is also complaining of getting up anywhere from 3-5 times at night  to empty her bladder.  She feels like she gets symptoms of a bladder infection and will use over-the-counter Azo which helps with her symptoms but she feels like she gets them repeatedly.  She is able to leave a sample today.  She feels that she does not have asthma symptoms during the day while at work but occasionally does have to run to the bathroom.    Today's PHQ-2 Score:   PHQ-2 ( 1999 Pfizer) 2/1/2021 4/26/2017   Q1: Little interest or pleasure in doing things 0 1   Q2: Feeling down, depressed or hopeless 0 1   PHQ-2 Score 0 2       Abuse: Current or Past(Physical, Sexual or Emotional)- No  Do you feel safe in your environment? Yes    Have you ever done Advance Care Planning? (For example, a Health Directive, POLST, or a discussion with a medical provider or your loved ones about your wishes): Yes, patient states has an Advance Care Planning document and will bring a copy to the clinic.    Social History     Tobacco Use     Smoking status: Current Every Day Smoker     Packs/day: 1.00     Years: 15.00     Pack years: 15.00     Smokeless tobacco: Never Used   Substance Use Topics     Alcohol use: Yes     Comment: once per year     If you drink alcohol do you typically have >3 drinks per day or >7 drinks per week? No                     Reviewed orders with patient.  Reviewed health maintenance and updated orders accordingly - Yes  Lab work is in process  Labs reviewed in EPIC  BP Readings from Last 3 Encounters:   02/01/21 138/66   05/08/19 (P) 106/68   03/22/19 104/68    Wt Readings from Last 3 Encounters:   02/01/21 75.8 kg (167 lb)   05/08/19 78.5 kg (173 lb)   03/22/19 79.1 kg (174 lb 6.4 oz)                  Patient Active Problem List   Diagnosis     Female infertility     Personal history of tobacco use, presenting hazards to health     Deviated nasal septum     Dyspnea and respiratory abnormality     Situational depression     DIDI (generalized anxiety disorder)     Menopausal syndrome (hot flashes)      Other insomnia     Chest tightness or pressure     Adjustment disorder with mixed anxiety and depressed mood     Past Surgical History:   Procedure Laterality Date     HC REPAIR OF NASAL SEPTUM  04/22/08    Septoplasty. Resect inferior turbinates, Partial uvulectomy.       Social History     Tobacco Use     Smoking status: Current Every Day Smoker     Packs/day: 1.00     Years: 15.00     Pack years: 15.00     Smokeless tobacco: Never Used   Substance Use Topics     Alcohol use: Yes     Frequency: Monthly or less     Drinks per session: 1 or 2     Binge frequency: Never     Comment: once per year     Family History   Problem Relation Age of Onset     Arthritis Mother      Diabetes Mother         insulin type II     Thyroid Disease Mother         hypo     EYE* Mother         glaucoma     Lung Cancer Mother         d/c 78     Cancer Father         Cancer from asbestos exposure 1999 (Mesothelioma)     Hypertension Father      Kidney Disease Sister         Kidney transplant 2/2 kidney failure due to NSAID use     Other - See Comments Daughter         1991     Alcohol/Drug No family hx of      Allergies No family hx of      Alzheimer Disease No family hx of      Anesthesia Reaction No family hx of      Blood Disease No family hx of      Depression No family hx of      Genetic Disorder No family hx of      Gastrointestinal Disease No family hx of      Genitourinary Problems No family hx of      Gynecology No family hx of      Heart Disease No family hx of      Lipids No family hx of      Neurologic Disorder No family hx of      Osteoporosis No family hx of      Psychotic Disorder No family hx of      Respiratory No family hx of      Cerebrovascular Disease No family hx of      Obesity No family hx of          No current outpatient medications on file.     Allergies   Allergen Reactions     Penicillins      Sulfa Drugs      Recent Labs   Lab Test 03/14/19  2220 01/10/19  1407 01/08/19  2030 04/26/17  1624 05/26/14  1315  14  1315   LDL  --  118*  --  134*  --   --    HDL  --  50  --  55  --   --    TRIG  --  187*  --  227*  --   --    ALT 21  --  22  --   --  28   CR 0.80  --  0.74  --   --  0.62   GFRESTIMATED 83  --  >90  --   --  >90   GFRESTBLACK >90  --  >90  --   --  >90   POTASSIUM 3.5  --  3.5  --   --  3.6   TSH 3.51 1.82  --  1.89   < >  --     < > = values in this interval not displayed.        Breast CA Risk Screening:  No flowsheet data found.    click delete button to remove this line now  Mammogram Screening: Recommended mammography every 1-2 years with patient discussion and risk factor consideration  Pertinent mammograms are reviewed under the imaging tab.    Pertinent mammograms are reviewed under the imaging tab.  History of abnormal Pap smear: NO - age 30-65 PAP every 5 years with negative HPV co-testing recommended  PAP / HPV Latest Ref Rng & Units 2017   PAP - NIL OTHER-NIL EM>40   HPV 16 DNA NEG Negative -   HPV 18 DNA NEG Negative -   OTHER HR HPV NEG Negative -     Reviewed and updated as needed this visit by clinical staff  Tobacco  Allergies               Reviewed and updated as needed this visit by Provider                Past Medical History:   Diagnosis Date     Female infertility of unspecified origin       Past Surgical History:   Procedure Laterality Date     HC REPAIR OF NASAL SEPTUM  08    Septoplasty. Resect inferior turbinates, Partial uvulectomy.     OB History    Para Term  AB Living   6 1 1 0 2 1   SAB TAB Ectopic Multiple Live Births   2 0 0 0 1      # Outcome Date GA Lbr Syl[place]/2nd Weight Sex Delivery Anes PTL Lv   6  99 8w0d    AB, COMPLETE      5  91 40w0d 06:00 3.714 kg (8 lb 3 oz) F IVD   ROYCE      Name: Sharon   4  88 8w0d    AB, COMPLETE      3 Term            2 SAB            1 SAB                ROS:  CONSTITUTIONAL: NEGATIVE for fever, chills, change in weight  INTEGUMENTARY/SKIN: NEGATIVE for worrisome  "rashes, moles or lesions  EYES: NEGATIVE for vision changes or irritation  ENT: NEGATIVE for ear, mouth and throat problems  RESP: NEGATIVE for significant cough or SOB  BREAST: NEGATIVE for masses, tenderness or discharge  CV: NEGATIVE for chest pain, palpitations or peripheral edema  GI: NEGATIVE for nausea, abdominal pain, heartburn, or change in bowel habits  : NEGATIVE for unusual urinary or vaginal symptoms. No vaginal bleeding.  MUSCULOSKELETAL: NEGATIVE for significant arthralgias or myalgia  NEURO: NEGATIVE for weakness, dizziness or paresthesias  PSYCHIATRIC: NEGATIVE for changes in mood or affect     OBJECTIVE:   /66   Pulse 99   Temp 97.4  F (36.3  C) (Temporal)   Resp 16   Ht 1.74 m (5' 8.5\")   Wt 75.8 kg (167 lb)   LMP 05/12/2014 (Approximate)   SpO2 96%   BMI 25.02 kg/m    EXAM:  GENERAL APPEARANCE: healthy, alert and no distress  EYES: Eyes grossly normal to inspection, PERRL and conjunctivae and sclerae normal  HENT: ear canals and TM's normal, nose and mouth without ulcers or lesions, oropharynx clear and oral mucous membranes moist  NECK: no adenopathy, no asymmetry, masses, or scars and thyroid normal to palpation  RESP: lungs clear to auscultation - no rales, rhonchi or wheezes  BREAST: No breast exam done, we discussed self breast awareness and what to be concerned about, ie; retraction of a nipple, dimpling of the skin or any nipple discharge or aerolar rash that does not clear.   CV: regular rate and rhythm, normal S1 S2, no S3 or S4, no murmur, click or rub, no peripheral edema and peripheral pulses strong  ABDOMEN: soft, nontender, no hepatosplenomegaly, no masses and bowel sounds normal   (female): Exam deferred, patient not due for a pap smear and she is without complaints or concerns today.   MS: no musculoskeletal defects are noted and gait is age appropriate without ataxia  SKIN: no suspicious lesions or rashes  NEURO: Normal strength and tone, sensory exam grossly " normal, mentation intact and speech normal  PSYCH: mentation appears normal and affect normal/bright    Diagnostic Test Results:  Labs reviewed in Epic  Results for orders placed or performed in visit on 02/01/21 (from the past 24 hour(s))   *UA reflex to Microscopic and Culture ([place]; [facility]; [facility]; [facility]; [facility]; [facility]; [place]; [place])    Specimen: Unspecified Urine   Result Value Ref [place]    Color Urine Yellow     Appearance Urine Slightly Cloudy     Glucose Urine Negative NEG^Negative mg/dL    Bilirubin Urine Negative NEG^Negative    Ketones Urine Negative NEG^Negative mg/dL    Specific Gravity Urine 1.021 1.003 - 1.035    Blood Urine Small (A) NEG^Negative    pH Urine 5.0 5.0 - 7.0 pH    Protein Albumin Urine Negative NEG^Negative mg/dL    Urobilinogen mg/dL 0.0 0.0 - 2.0 mg/dL    Nitrite Urine Positive (A) NEG^Negative    Leukocyte Esterase Urine Moderate (A) NEG^Negative    Source Unspecified Urine     RBC Urine 5 (H) 0 - 2 /HPF    WBC Urine 26 (H) 0 - 5 /HPF    Bacteria Urine Few (A) NEG^Negative /HPF    Squamous Epithelial /HPF Urine 1 0 - 1 /HPF    Mucous Urine Present (A) NEG^Negative /LPF   Glucose   Result Value Ref [place]    Glucose 95 70 - 99 mg/dL       ASSESSMENT/PLAN:   (Z00.00) Routine general medical examination at a health care facility  (primary encounter diagnosis)  Comment: Overall doing well but having a few symptoms as noted below.  Plan: Urine Culture Aerobic Bacterial        Urine culture is pending    (Z12.31) Visit for screening mammogram  Comment: Due for a mammogram  Plan: *MA Screening Digital Bilateral        Order placed and patient will set this up on her own.    (R35.1) Nocturia  (N39.0) Urinary tract infection without hematuria, site unspecified  Comment: Increased nocturia with evidence of a bladder infection today on urinalysis.  Plan:*UA reflex to Microscopic and Culture         ([place]; [facility]; [facility];        "  [place] [facility]; [place] - [facility]; [facility];         [place]; [place])   nitroFURantoin macrocrystal-monohydrate         (MACROBID) 100 MG capsule        We will go ahead and treat with Macrobid for 10 days.    (R73.09) Elevated glucose  Comment: She has had elevated glucose in the past but today's was normal.  Plan: Glucose        Recheck in 3 years.    (Z23) Need for vaccination  Comment: Patient is open to getting the shingles and the pneumococcal 23 vaccine today.  Plan: SHINGRIX [[id number]], Pneumococcal vaccine 23         valent PPSV23  (Pneumovax) [[id number]]        Both were given.    (R07.89) Chest pressure  Comment: Patient has been having some chest pressure sometimes associated with activity but oftentimes after eating meals.  Plan: Echocardiogram Exercise Stress        She does have a family history of cardiac disease so we will go ahead and set her up for a stress echo.  She is a heavy smoker so does have that risk factor also.      Patient has been advised of split billing requirements and indicates understanding: Yes  COUNSELING:   Reviewed preventive health counseling, as reflected in patient instructions       Regular exercise       Healthy diet/nutrition       Immunizations    Vaccinated for: Pneumococcal and Zoster             Alcohol Use       Colon cancer screening       One time pneumovax for smokers    Estimated body mass index is 25.02 kg/m  as calculated from the following:    Height as of this encounter: 1.74 m (5' 8.5\").    Weight as of this encounter: 75.8 kg (167 lb).    Weight management plan: Her weight has been relatively stable and she is only just above a BMI of 25.    She reports that she has been smoking. She has a 15.00 pack-year smoking history. She has never used smokeless tobacco.  Tobacco Cessation Action Plan:   Information offered: Patient not interested at this time      Counseling Resources:  ATP IV Guidelines  Pooled Cohorts Equation Calculator  Breast Cancer Risk " Calculator  BRCA-Related Cancer Risk Assessment: FHS-7 Tool  FRAX Risk Assessment  ICSI Preventive Guidelines  Dietary Guidelines for Americans, 2010  USDA's MyPlate  ASA Prophylaxis  Lung CA Screening    Electronically signed by:  Fernando Fonseca M.D.  2/1/2021

## 2021-02-01 NOTE — NURSING NOTE
Prior to immunization administration, verified patients identity using patient s name and date of birth. Please see Immunization Activity for additional information.     Screening Questionnaire for Adult Immunization    Are you sick today?   No   Do you have allergies to medications, food, a vaccine component or latex?   Yes   Have you ever had a serious reaction after receiving a vaccination?   No   Do you have a long-term health problem with heart, lung, kidney, or metabolic disease (e.g., diabetes), asthma, a blood disorder, no spleen, complement component deficiency, a cochlear implant, or a spinal fluid leak?  Are you on long-term aspirin therapy?   No   Do you have cancer, leukemia, HIV/AIDS, or any other immune system problem?   No   Do you have a parent, brother, or sister with an immune system problem?   No   In the past 3 months, have you taken medications that affect  your immune system, such as prednisone, other steroids, or anticancer drugs; drugs for the treatment of rheumatoid arthritis, Crohn s disease, or psoriasis; or have you had radiation treatments?   No   Have you had a seizure, or a brain or other nervous system problem?   No   During the past year, have you received a transfusion of blood or blood    products, or been given immune (gamma) globulin or antiviral drug?   No   For women: Are you pregnant or is there a chance you could become       pregnant during the next month?   No   Have you received any vaccinations in the past 4 weeks?   No     Immunization questionnaire was positive for at least one answer.  Notified Ok to give per Dr. Fonseca.       Patient instructed to remain in clinic for 15 minutes afterwards, and to report any adverse reaction to me immediately.       Screening performed by Galilea Siegel on 2/1/2021 at 4:24 PM.

## 2021-02-02 ENCOUNTER — TELEPHONE (OUTPATIENT)
Dept: FAMILY MEDICINE | Facility: CLINIC | Age: 56
End: 2021-02-02

## 2021-02-02 DIAGNOSIS — G47.09 OTHER INSOMNIA: Primary | ICD-10-CM

## 2021-02-02 RX ORDER — TEMAZEPAM 15 MG/1
15-30 CAPSULE ORAL
Qty: 60 CAPSULE | Refills: 5 | Status: SHIPPED | OUTPATIENT
Start: 2021-02-02 | End: 2022-08-23

## 2021-02-02 NOTE — TELEPHONE ENCOUNTER
Patient calling and stating she has not setup her mychart yet and was calling to follow up on her labs that she completed. Informed of her results as noted per provider, that she does have a urinary tract infection, and that a prescription for an antibiotic was sent to the [facility] pharmacy, [place]. Informed to keep provider updated as she may need to see Urology. Patient understands. Patient questioning if a medication to help with her sleep was sent to the pharmacy also? Informed that there is no medication noted for sleep. Informed we would send send message to provider. Susie Pascual LPN

## 2021-02-03 ENCOUNTER — TELEPHONE (OUTPATIENT)
Dept: FAMILY MEDICINE | Facility: CLINIC | Age: 56
End: 2021-02-03

## 2021-02-03 NOTE — TELEPHONE ENCOUNTER
I called and left a msg to call back./  Ayaka Baez MA           ----- Message from Fernando Fonseca MD sent at 2/3/2021  1:00 PM CST -----  Let Richmond know that her urine culture did grow out E. coli.  It is sensitive to the antibiotics that I put her on so she should have improvement in her symptoms.  If not we would need to repeat her urine test in 2 to 3 weeks.    Electronically signed by:  Fernando Fonseca M.D.  2/3/2021

## 2021-02-04 LAB
BACTERIA SPEC CULT: ABNORMAL
Lab: ABNORMAL
SPECIMEN SOURCE: ABNORMAL

## 2021-02-08 ENCOUNTER — HOSPITAL ENCOUNTER (OUTPATIENT)
Dept: CARDIOLOGY | Facility: CLINIC | Age: 56
Discharge: HOME | End: 2021-02-08
Attending: FAMILY MEDICINE | Admitting: FAMILY MEDICINE
Payer: COMMERCIAL

## 2021-02-08 DIAGNOSIS — R07.89 CHEST PRESSURE: ICD-10-CM

## 2021-02-08 PROCEDURE — 93350 STRESS TTE ONLY: CPT | Mod: 26 | Performed by: INTERNAL MEDICINE

## 2021-02-08 PROCEDURE — 93325 DOPPLER ECHO COLOR FLOW MAPG: CPT | Mod: 26 | Performed by: INTERNAL MEDICINE

## 2021-02-08 PROCEDURE — 93321 DOPPLER ECHO F-UP/LMTD STD: CPT | Mod: 26 | Performed by: INTERNAL MEDICINE

## 2021-02-08 PROCEDURE — 93350 STRESS TTE ONLY: CPT | Mod: TC

## 2021-02-08 PROCEDURE — 93016 CV STRESS TEST SUPVJ ONLY: CPT | Performed by: INTERNAL MEDICINE

## 2021-02-08 PROCEDURE — 93325 DOPPLER ECHO COLOR FLOW MAPG: CPT | Mod: TC

## 2021-02-08 PROCEDURE — 93018 CV STRESS TEST I&R ONLY: CPT | Performed by: INTERNAL MEDICINE

## 2021-02-09 ENCOUNTER — TELEPHONE (OUTPATIENT)
Dept: FAMILY MEDICINE | Facility: CLINIC | Age: 56
End: 2021-02-09

## 2021-02-09 NOTE — TELEPHONE ENCOUNTER
----- Message from Fernando Fonseca MD sent at 2/8/2021  8:28 PM CST -----  Let Richmond know that her stress echo was completely normal.  Her chest pressure is not related to her heart and most likely stress.    Electronically signed by:  Fernando Fonseca M.D.  2/8/2021

## 2021-02-09 NOTE — TELEPHONE ENCOUNTER
Called and LM for patient to call back. Please relay results below to patient from Dr. Fonseca.   Martha River MA

## 2021-03-01 ENCOUNTER — VIRTUAL VISIT (OUTPATIENT)
Dept: URGENT CARE | Facility: CLINIC | Age: 56
End: 2021-03-01
Payer: COMMERCIAL

## 2021-03-01 DIAGNOSIS — R05.9 COUGH: Primary | ICD-10-CM

## 2021-03-01 PROCEDURE — 99213 OFFICE O/P EST LOW 20 MIN: CPT | Mod: 95 | Performed by: EMERGENCY MEDICINE

## 2021-03-01 NOTE — PROGRESS NOTES
HPI: Patient is a 55-year-old female who developed symptoms over the weekend i.e. 2 to 3 days ago.  She developed a cough and congestion.  She now has had a sense of dry mouth and loss of taste sensation.  No shortness of breath.  No fever.  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      ROS: See HPI otherwise normal.    Allergies   Allergen Reactions     Penicillins      Sulfa Drugs       Current Outpatient Medications   Medication Sig Dispense Refill     temazepam (RESTORIL) 15 MG capsule Take 1-2 capsules (15-30 mg) by mouth nightly as needed for sleep 60 capsule 5         PE: No acute distress.  Patient is alert and oriented.  Nondyspneic sounding on the phone.        TREATMENT: None.      ASSESSMENT: Symptoms suspicious of Covid viral illness with no severe associated symptoms at this time.  Testing is indicated.      DIAGNOSIS: Upper respiratory infection.  Alteration of taste      PLAN: Covid PCR ordered.  Testing team to follow.  Follow-up with PCP or phone appointment if any worsening symptoms or concerns.    Time: 10 minutes

## 2021-03-03 LAB — COLOGUARD-ABSTRACT: POSITIVE

## 2021-03-09 ENCOUNTER — TELEPHONE (OUTPATIENT)
Dept: FAMILY MEDICINE | Facility: CLINIC | Age: 56
End: 2021-03-09

## 2021-03-09 DIAGNOSIS — R19.5 POSITIVE COLORECTAL CANCER SCREENING USING COLOGUARD TEST: Primary | ICD-10-CM

## 2021-03-09 NOTE — TELEPHONE ENCOUNTER
[facility] calling to make sure Dr Fonseca received Positive Cologuard Report for patient. Looks like report was scanned to patients chart but do not see documentation that patient has been contacted yet.

## 2021-03-09 NOTE — TELEPHONE ENCOUNTER
I printed off her Cologuard results and it is positive so she needs a diagnostic colonoscopy.  I will place the order for this and alerted the patient to the fact that somebody would be calling her to schedule this for her.    Electronically signed by:  Fernando Fonseca M.D.  3/9/2021

## 2021-03-12 ENCOUNTER — TELEPHONE (OUTPATIENT)
Dept: FAMILY MEDICINE | Facility: CLINIC | Age: 56
End: 2021-03-12

## 2021-03-12 NOTE — LETTER

## 2021-03-12 NOTE — TELEPHONE ENCOUNTER
Date of procedure: 4/8/21  Colonoscopy  Surgeon: Dr. Marte  Prep:Miralax  Packet:Colonoscopy/EGD instructions mailed to patient's home address.   Date: 3/12/2021      Surgery Scheduler

## 2021-03-24 DIAGNOSIS — Z11.59 ENCOUNTER FOR SCREENING FOR OTHER VIRAL DISEASES: ICD-10-CM

## 2021-03-29 ENCOUNTER — HOSPITAL ENCOUNTER (OUTPATIENT)
Dept: MAMMOGRAPHY | Facility: CLINIC | Age: 56
Discharge: HOME | End: 2021-03-29
Attending: FAMILY MEDICINE | Admitting: FAMILY MEDICINE
Payer: COMMERCIAL

## 2021-03-29 DIAGNOSIS — Z12.31 VISIT FOR SCREENING MAMMOGRAM: ICD-10-CM

## 2021-03-29 PROCEDURE — 77067 SCR MAMMO BI INCL CAD: CPT

## 2021-04-05 DIAGNOSIS — Z11.59 ENCOUNTER FOR SCREENING FOR OTHER VIRAL DISEASES: ICD-10-CM

## 2021-04-05 LAB
SARS-COV-2 RNA RESP QL NAA+PROBE: NORMAL
SPECIMEN SOURCE: NORMAL

## 2021-04-05 PROCEDURE — U0005 INFEC AGEN DETEC AMPLI PROBE: HCPCS | Performed by: SURGERY

## 2021-04-05 PROCEDURE — U0003 INFECTIOUS AGENT DETECTION BY NUCLEIC ACID (DNA OR RNA); SEVERE ACUTE RESPIRATORY SYNDROME CORONAVIRUS 2 (SARS-COV-2) (CORONAVIRUS DISEASE [COVID-19]), AMPLIFIED PROBE TECHNIQUE, MAKING USE OF HIGH THROUGHPUT TECHNOLOGIES AS DESCRIBED BY CMS-2020-01-R: HCPCS | Performed by: SURGERY

## 2021-04-06 LAB
LABORATORY COMMENT REPORT: NORMAL
SARS-COV-2 RNA RESP QL NAA+PROBE: NEGATIVE
SPECIMEN SOURCE: NORMAL

## 2021-04-07 ENCOUNTER — ANESTHESIA EVENT (OUTPATIENT)
Dept: GASTROENTEROLOGY | Facility: CLINIC | Age: 56
End: 2021-04-07
Payer: COMMERCIAL

## 2021-04-07 ASSESSMENT — LIFESTYLE VARIABLES: TOBACCO_USE: 1

## 2021-04-08 ENCOUNTER — ANESTHESIA (OUTPATIENT)
Dept: GASTROENTEROLOGY | Facility: CLINIC | Age: 56
End: 2021-04-08
Payer: COMMERCIAL

## 2021-04-08 ENCOUNTER — HOSPITAL ENCOUNTER (OUTPATIENT)
Facility: CLINIC | Age: 56
Discharge: HOME | End: 2021-04-08
Attending: SURGERY | Admitting: SURGERY
Payer: COMMERCIAL

## 2021-04-08 VITALS
HEIGHT: 69 IN | OXYGEN SATURATION: 100 % | TEMPERATURE: 98.1 F | WEIGHT: 167.11 LBS | DIASTOLIC BLOOD PRESSURE: 68 MMHG | SYSTOLIC BLOOD PRESSURE: 112 MMHG | BODY MASS INDEX: 24.75 KG/M2 | HEART RATE: 85 BPM | RESPIRATION RATE: 16 BRPM

## 2021-04-08 LAB — COLONOSCOPY: NORMAL

## 2021-04-08 PROCEDURE — 250N000009 HC RX 250: Performed by: NURSE ANESTHETIST, CERTIFIED REGISTERED

## 2021-04-08 PROCEDURE — 45385 COLONOSCOPY W/LESION REMOVAL: CPT | Performed by: SURGERY

## 2021-04-08 PROCEDURE — 45380 COLONOSCOPY AND BIOPSY: CPT | Performed by: SURGERY

## 2021-04-08 PROCEDURE — 88305 TISSUE EXAM BY PATHOLOGIST: CPT | Mod: TC | Performed by: SURGERY

## 2021-04-08 PROCEDURE — 370N000017 HC ANESTHESIA TECHNICAL FEE, PER MIN: Performed by: SURGERY

## 2021-04-08 PROCEDURE — 250N000009 HC RX 250: Performed by: SURGERY

## 2021-04-08 PROCEDURE — 88305 TISSUE EXAM BY PATHOLOGIST: CPT | Mod: 26 | Performed by: PATHOLOGY

## 2021-04-08 PROCEDURE — 258N000003 HC RX IP 258 OP 636: Performed by: SURGERY

## 2021-04-08 PROCEDURE — 250N000011 HC RX IP 250 OP 636: Performed by: NURSE ANESTHETIST, CERTIFIED REGISTERED

## 2021-04-08 RX ORDER — PROPOFOL 10 MG/ML
INJECTION, EMULSION INTRAVENOUS CONTINUOUS PRN
Status: DISCONTINUED | OUTPATIENT
Start: 2021-04-08 | End: 2021-04-08

## 2021-04-08 RX ORDER — PROPOFOL 10 MG/ML
INJECTION, EMULSION INTRAVENOUS PRN
Status: DISCONTINUED | OUTPATIENT
Start: 2021-04-08 | End: 2021-04-08

## 2021-04-08 RX ORDER — SODIUM CHLORIDE, SODIUM LACTATE, POTASSIUM CHLORIDE, CALCIUM CHLORIDE 600; 310; 30; 20 MG/100ML; MG/100ML; MG/100ML; MG/100ML
INJECTION, SOLUTION INTRAVENOUS CONTINUOUS
Status: DISCONTINUED | OUTPATIENT
Start: 2021-04-08 | End: 2021-04-08 | Stop reason: HOSPADM

## 2021-04-08 RX ORDER — LIDOCAINE HYDROCHLORIDE 20 MG/ML
INJECTION, SOLUTION INFILTRATION; PERINEURAL PRN
Status: DISCONTINUED | OUTPATIENT
Start: 2021-04-08 | End: 2021-04-08

## 2021-04-08 RX ORDER — LIDOCAINE 40 MG/G
CREAM TOPICAL
Status: DISCONTINUED | OUTPATIENT
Start: 2021-04-08 | End: 2021-04-08 | Stop reason: HOSPADM

## 2021-04-08 RX ADMIN — LIDOCAINE HYDROCHLORIDE 40 MG: 20 INJECTION, SOLUTION INFILTRATION; PERINEURAL at 09:03

## 2021-04-08 RX ADMIN — SODIUM CHLORIDE, POTASSIUM CHLORIDE, SODIUM LACTATE AND CALCIUM CHLORIDE: 600; 310; 30; 20 INJECTION, SOLUTION INTRAVENOUS at 08:41

## 2021-04-08 RX ADMIN — PROPOFOL 50 MG: 10 INJECTION, EMULSION INTRAVENOUS at 09:05

## 2021-04-08 RX ADMIN — PROPOFOL 200 MCG/KG/MIN: 10 INJECTION, EMULSION INTRAVENOUS at 09:03

## 2021-04-08 RX ADMIN — LIDOCAINE HYDROCHLORIDE 1 ML: 10 INJECTION, SOLUTION EPIDURAL; INFILTRATION; INTRACAUDAL; PERINEURAL at 08:41

## 2021-04-08 ASSESSMENT — MIFFLIN-ST. JEOR: SCORE: 1409.5

## 2021-04-08 NOTE — LETTER
Richmond Montanez  [address] [facility] [address]      April 15, 2021    Dear Richmond,  This letter is written to inform you of the results of your recent colonoscopy.  Your examination showed polyp(s) in your rectum. All polyps were removed in their entirety and sent for review by a pathologist. As you will see on the pathology report below, the tissue(s) were hyperplastic polyps. Your examination was otherwise without abnormality.    SPECIMEN(S):   Rectal polyp     FINAL DIAGNOSIS:   Large intestine, rectum, polyps, biopsy/polypectomy:   - Hyperplastic polyps.     Hyperplastic polyps are entirely benign (non-cancerous) and rarely associated with the development of additional polyps or colorectal cancer.    Given these findings,  I recommend that you undergo a repeat colonoscopy in ten years for screening. We will enter you into a recall system so you receive a reminder closer to the time that you are due for repeat examination.     Please remember that this recommendation is made with the understanding that you are not experiencing persistent changes in bowel function, bleeding per rectum, and/or significant abdominal pain. If you experience these symptoms, please contact your primary care provider for a further evaluation.     If you have any questions or concerns about the results of your colonoscopy or the appropriate follow-up, please contact my assistant at [phone]    Sincerely,        [facility],   [facility]  ___

## 2021-04-08 NOTE — ANESTHESIA POSTPROCEDURE EVALUATION
Patient: Richmond Montanez    Procedure(s):  Colonoscopy withpolypectomy    Diagnosis:Positive colorectal cancer screening using Cologuard test [R19.5]  Diagnosis Additional Information: No value filed.    Anesthesia Type:  MAC    Note:  Disposition: Outpatient   Postop Pain Control:    PONV: No   Neuro/Psych:    Airway/Respiratory: Uneventful            Sign Out: Acceptable/Baseline resp. status   CV/Hemodynamics: Uneventful            Sign Out: Acceptable CV status   Other NRE: NONE   DID A NON-ROUTINE EVENT OCCUR? No    Event details/Postop Comments:  Patient was pleased with her anesthesia. No concerns.          Last vitals:  Vitals:    04/08/21 0842 04/08/21 0934 04/08/21 0945   BP:  111/60 112/67   Pulse:   85   Resp:  16    Temp: 98.1  F (36.7  C)     SpO2:  97% 100%       Last vitals prior to Anesthesia Care Transfer:  CRNA VITALS  4/8/2021 0900 - 4/8/2021 0951      4/8/2021             Pulse:  79    SpO2:  94 %    Resp Rate (observed):  19    EKG:  Sinus rhythm          Electronically Signed By: CLARI Lucas CRNA  April 8, 2021  9:51 AM

## 2021-04-08 NOTE — H&P
"[facility]    Pre-Endoscopy History and Physical     Richmond Montanez MRN# [id number]   YOB: 1965 Age: 55 year old     Date of Procedure: 4/8/2021  Primary care provider: Fernando Fonseca  Type of Endoscopy: Colonoscopy with possible biopsy, possible polypectomy  Reason for Procedure: positive cologuard  Type of Anesthesia Anticipated: MAC    HPI:    Richmond is a 55 year old female who will be undergoing the above procedure.  1st colonoscopy; cologuard is \"positive.\"  no famhx of colon cancer.  Pt stated her colon prep not completely clear.  Pt understands that the colonoscopy may be aborted and she will continue her prep and get redo on 4/9/2021.      A history and physical has been performed. The patient's medications and allergies have been reviewed. The risks and benefits of the procedure and the sedation options and risks were discussed with the patient.  All questions were answered and informed consent was obtained.      She denies a personal or family history of anesthesia complications or bleeding disorders.     Patient Active Problem List   Diagnosis     Female infertility     Personal history of tobacco use, presenting hazards to health     Deviated nasal septum     Dyspnea and respiratory abnormality     Situational depression     DIDI (generalized anxiety disorder)     Menopausal syndrome (hot flashes)     Other insomnia     Chest pressure     Adjustment disorder with mixed anxiety and depressed mood     Urinary tract infection without hematuria, site unspecified     Nocturia     Positive colorectal cancer screening using Cologuard test        Past Medical History:   Diagnosis Date     Female infertility of unspecified origin         Past Surgical History:   Procedure Laterality Date     HC REPAIR OF NASAL SEPTUM  04/22/08    Septoplasty. Resect inferior turbinates, Partial uvulectomy.       Social History     Tobacco Use     Smoking status: Current Every Day Smoker     " Packs/day: 1.00     Years: 15.00     Pack years: 15.00     Smokeless tobacco: Never Used   Substance Use Topics     Alcohol use: Yes     Frequency: Monthly or less     Drinks per session: 1 or 2     Binge frequency: Never     Comment: once per year       Family History   Problem Relation Age of Onset     Arthritis Mother      Diabetes Mother         insulin type II     Thyroid Disease Mother         hypo     EYE* Mother         glaucoma     Lung Cancer Mother         d/c 78     Coronary Artery Disease Mother         triple bypass and valve replacement     Cancer Father         Cancer from asbestos exposure 1999 (Mesothelioma)     Hypertension Father      Kidney Disease Sister         Kidney transplant 2/2 kidney failure due to NSAID use     Other - See Comments Daughter         1991     Alcohol/Drug No family hx of      Allergies No family hx of      Alzheimer Disease No family hx of      Anesthesia Reaction No family hx of      Blood Disease No family hx of      Depression No family hx of      Genetic Disorder No family hx of      Gastrointestinal Disease No family hx of      Genitourinary Problems No family hx of      Gynecology No family hx of      Heart Disease No family hx of      Lipids No family hx of      Neurologic Disorder No family hx of      Osteoporosis No family hx of      Psychotic Disorder No family hx of      Respiratory No family hx of      Cerebrovascular Disease No family hx of      Obesity No family hx of        Prior to Admission medications    Medication Sig Start Date End Date Taking? Authorizing Provider   temazepam (RESTORIL) 15 MG capsule Take 1-2 capsules (15-30 mg) by mouth nightly as needed for sleep 2/2/21   Fernando Fonseca MD       Allergies   Allergen Reactions     Penicillins      Sulfa Drugs         REVIEW OF SYSTEMS:   5 point ROS negative except as noted above in HPI, including Gen., Resp., CV, GI &  system review.    PHYSICAL EXAM:   /75   Pulse 92   Temp 98.1  F  "(36.7  C) (Oral)   Resp 18   Ht 1.74 m (5' 8.5\")   Wt 75.8 kg (167 lb 1.7 oz)   LMP 05/12/2014 (Approximate)   SpO2 100%   BMI 25.04 kg/m   Estimated body mass index is 25.04 kg/m  as calculated from the following:    Height as of this encounter: 1.74 m (5' 8.5\").    Weight as of this encounter: 75.8 kg (167 lb 1.7 oz).   Constitutional: Awake, alert, no acute distress.  Eyes: No scleral icterus.  Conjunctiva are without injection.  ENMT: Mucous membranes moist, dentition and gums are intact.   Neck: Soft, supple, trachea midline.    Endocrine: n/a   Lymphatic: There is no cervical, submandibularadenopathy.  Respiratory: Lungs are clear to auscultation and percussion bilaterally.   Cardiovascular: Regular rate and rhythm. No murmurs, rubs, or gallops.    Abdomen: Non-distended, non-tender, normoactive bowel sounds present, No masses,  Musculoskeletal: No spinal or CVA tenderness. Full range of motion in the upper and lower extremities.    Skin: No skin rashes or lesions to inspection.  No petechia.    Neurologic: Cranial nerves II through XII are grossly intact and symmetric.  Psychiatric: The patient is alert and oriented times 3.  The patient's affect is not blunted and mood is appropriate.  DIAGNOSTICS:    Not indicated    IMPRESSION   ASA Class 2 - Mild systemic disease    PLAN:   Plan for Colonoscopy with possible biopsy, possible polypectomy. We discussed the risks, benefits and alternatives and the patient wished to proceed.  Patient is cleared for the above procedure.    The above has been forwarded to the consulting provider.    [facility], [facility]        "

## 2021-04-08 NOTE — ANESTHESIA CARE TRANSFER NOTE
Patient: Richmond Montanez    Procedure(s):  Colonoscopy withpolypectomy    Diagnosis: Positive colorectal cancer screening using Cologuard test [R19.5]  Diagnosis Additional Information: No value filed.    Anesthesia Type:   MAC     Note:    Oropharynx: oropharynx clear of all foreign objects and spontaneously breathing  Level of Consciousness: drowsy  Oxygen Supplementation: room air    Independent Airway: airway patency satisfactory and stable  Dentition: dentition unchanged  Vital Signs Stable: post-procedure vital signs reviewed and stable  Report to RN Given: handoff report given  Patient transferred to: Phase II    Handoff Report: Identifed the Patient, Identified the Reponsible Provider, Reviewed the pertinent medical history, Discussed the surgical course, Reviewed Intra-OP anesthesia mangement and issues during anesthesia, Set expectations for post-procedure period and Allowed opportunity for questions and acknowledgement of understanding      Vitals: (Last set prior to Anesthesia Care Transfer)  CRNA VITALS  4/8/2021 0900 - 4/8/2021 0934      4/8/2021             Pulse:  79    SpO2:  94 %    Resp Rate (observed):  19    EKG:  Sinus rhythm        Electronically Signed By: CLARI Lucas CRNA  April 8, 2021  9:34 AM

## 2021-04-08 NOTE — ANESTHESIA PREPROCEDURE EVALUATION
Anesthesia Pre-Procedure Evaluation    Patient: Richmond Montanez   MRN: [id number] : 1965        Preoperative Diagnosis: Positive colorectal cancer screening using Cologuard test [R19.5]   Procedure : Procedure(s):  Colonoscopy with possible biopsy and/or polypectomy     Past Medical History:   Diagnosis Date     Female infertility of unspecified origin       Past Surgical History:   Procedure Laterality Date     HC REPAIR OF NASAL SEPTUM  08    Septoplasty. Resect inferior turbinates, Partial uvulectomy.      Allergies   Allergen Reactions     Penicillins      Sulfa Drugs       Social History     Tobacco Use     Smoking status: Current Every Day Smoker     Packs/day: 1.00     Years: 15.00     Pack years: 15.00     Smokeless tobacco: Never Used   Substance Use Topics     Alcohol use: Yes     Frequency: Monthly or less     Drinks per session: 1 or 2     Binge frequency: Never     Comment: once per year      Wt Readings from Last 1 Encounters:   21 75.8 kg (167 lb)        Anesthesia Evaluation            ROS/MED HX  ENT/Pulmonary: Comment: Dyspnea  Deviated septum    (+) tobacco use, Current use,     Neurologic:       Cardiovascular: Comment: H/O chest pressure    (+) -----Previous cardiac testing   Echo: Date: Results:    Stress Test: Date: 21 Results:  Normal resting heart rate and blood pressure with normal response to exercise  Normal resting ECG, no ischemic changes seen with exercise  Normal exercise capacity for age  No chest pain with exercise  Normal resting LV function with good augmentation of all wall segments post  exercise  This is a normal stress echo indicating low probability of significant  exercise-induced myocardial ischemia.  No hemodynamically significant valvular abnormalities on 2D or color flow  imaging.  ECG Reviewed: Date: 2021 Results:  SR  Cath: Date: Results:      METS/Exercise Tolerance:     Hematologic:  - neg hematologic  ROS     Musculoskeletal:  - neg  musculoskeletal ROS     GI/Hepatic: Comment: Positive colo guard test      Renal/Genitourinary:       Endo:  - neg endo ROS     Psychiatric/Substance Use: Comment: Insomnia  Mood disorder    (+) psychiatric history anxiety and depression     Infectious Disease:  - neg infectious disease ROS     Malignancy:  - neg malignancy ROS     Other:            Physical Exam    Airway        Mallampati: II   TM distance: > 3 FB    Mouth opening: > 3 cm    Respiratory Devices and Support         Dental  no notable dental history         Cardiovascular   cardiovascular exam normal       Rhythm and rate: regular and normal     Pulmonary   pulmonary exam normal        breath sounds clear to auscultation           OUTSIDE LABS:  CBC:   Lab Results   Component Value Date    WBC 11.5 (H) 03/14/2019    WBC 9.5 01/08/2019    HGB 13.9 03/14/2019    HGB 13.7 01/08/2019    HCT 41.0 03/14/2019    HCT 40.4 01/08/2019     03/14/2019     01/08/2019     BMP:   Lab Results   Component Value Date     03/14/2019     (H) 01/08/2019    POTASSIUM 3.5 03/14/2019    POTASSIUM 3.5 01/08/2019    CHLORIDE 110 (H) 03/14/2019    CHLORIDE 112 (H) 01/08/2019    CO2 22 03/14/2019    CO2 26 01/08/2019    BUN 23 03/14/2019    BUN 16 01/08/2019    CR 0.80 03/14/2019    CR 0.74 01/08/2019    GLC 95 02/01/2021     (H) 03/14/2019     COAGS: No results found for: PTT, INR, FIBR  POC:   Lab Results   Component Value Date    HCG Negative 05/26/2014     HEPATIC:   Lab Results   Component Value Date    ALBUMIN 3.8 03/14/2019    PROTTOTAL 7.3 03/14/2019    ALT 21 03/14/2019    AST 16 03/14/2019    ALKPHOS 112 03/14/2019    BILITOTAL 0.2 03/14/2019     OTHER:   Lab Results   Component Value Date    PH 5.0 04/11/2002    LACT 1.0 05/26/2014    SARAH 8.3 (L) 03/14/2019    MAG 2.3 03/14/2019    LIPASE 180 03/14/2019    TSH 3.51 03/14/2019    T4 0.9 04/11/2002       Anesthesia Plan    ASA Status:  2   NPO Status:  NPO Appropriate    Anesthesia  Type: MAC.     - Reason for MAC: straight local not clinically adequate   Induction: Intravenous, Propofol.   Maintenance: TIVA.        Consents    Anesthesia Plan(s) and associated risks, benefits, and realistic alternatives discussed. Questions answered and patient/representative(s) expressed understanding.     - Discussed with:  Patient      - Extended Intubation/Ventilatory Support Discussed: No.      - Patient is DNR/DNI Status: No    Use of blood products discussed: No .     Postoperative Care    Pain management: Oral pain medications.        Comments:                CLARI Lucas CRNA

## 2021-04-08 NOTE — DISCHARGE INSTRUCTIONS
[facility]    Home Care Following Endoscopy    Dr. Marte      Activity:    You have just undergone an endoscopic procedure performed with monitored anesthesia care / sedation.  Do not work or operate machinery (including a car)or drink alcohol (icluding beer) for at least 12 hours.      I encourage you to walk and attempt to pass this air as soon as possible.    Diet:    Return to the diet you were on before your procedure but eat lightly for the first 12-24 hours.    Drink plenty of water.    Resume any regular medications unless otherwise advised by your physician.       You had 4 polyps removed so please refrain from aspirin or aspirin products for 2 days.     Pain:    You may take Tylenol as needed for pain.  Expected Recovery:  You can expect some mild abdominal fullness and/or discomfort due to the air used to inflate your intestinal tract.      Call Your Physician if You Have:    After Colonoscopy:  o Worsening persisting abdominal pain which is worse with activity.  o Fevers (>101 degrees F), chills or shakes.  o Passage of continued blood with bowel movements.   Any questions or concerns about your recovery, please call [phone] or after hours [phone] [facility] Nurse Advice Line.    Follow-up Care:    You should receive a letter in the mail with your results in 7-10 days. Please call [phone] if you have not received a notification of your results.

## 2021-04-08 NOTE — LETTER
Richmond Montanez  [address] [facility] [address]      April 12, 2021    Dear Richmond,  This letter is written to inform you of the results of your recent colonoscopy.  Your examination showed polyp(s) in your rectum. All polyps were removed in their entirety and sent for review by a pathologist. As you will see on the pathology report below, the tissue(s) were hyperplastic polyps. Your examination was otherwise without abnormality.    SPECIMEN(S):   Rectal polyp     FINAL DIAGNOSIS:   Large intestine, rectum, polyps, biopsy/polypectomy:   - Hyperplastic polyps.     Hyperplastic polyps are entirely benign (non-cancerous) and rarely associated with the development of additional polyps or colorectal cancer.    Given these findings,   I recommend that you undergo a repeat colonoscopy in ten years for screening. We will enter you into a recall system so you receive a reminder closer to the time that you are due for repeat examination.     Please remember that this recommendation is made with the understanding that you are not experiencing persistent changes in bowel function, bleeding per rectum, and/or significant abdominal pain. If you experience these symptoms, please contact your primary care provider for a further evaluation.     If you have any questions or concerns about the results of your colonoscopy or the appropriate follow-up, please contact my assistant at [phone]    Sincerely,        [facility],   [facility]  ___

## 2021-04-09 LAB — COPATH REPORT: NORMAL

## 2021-04-17 NOTE — NURSING NOTE
"Chief Complaint   Patient presents with     Fall     3/5/18 on ice- hit back steps     Back Pain       Initial /70 (BP Location: Left arm, Patient Position: Chair, Cuff Size: Adult Regular)  Pulse 80  Temp 97.4  F (36.3  C) (Tympanic)  Ht 5' 8\" (1.727 m)  Wt 174 lb (78.9 kg)  LMP 05/12/2014 (Approximate)  SpO2 99%  BMI 26.46 kg/m2 Estimated body mass index is 26.46 kg/(m^2) as calculated from the following:    Height as of this encounter: 5' 8\" (1.727 m).    Weight as of this encounter: 174 lb (78.9 kg).  Medication Reconciliation: complete  Health Maintenance reviewed at today's visit patient asked to schedule/complete:   Colon Cancer:  Patient agrees to schedule   Bryanna AVILA      " [address]  ED  Emergency Department Encounter  EmergencyMedicine Resident     Pt Name:Janet Salazar Asa  MRN: [id number]  [place] 1988  Date of evaluation: 4/17/21  PCP:  No primary care provider on file. CHIEF COMPLAINT       Chief Complaint   Patient presents with    Dental Pain       HISTORY OF PRESENT ILLNESS  (Location/Symptom, Timing/Onset, Context/Setting, Quality, Duration, Modifying Factors, Severity.)      Esvin Ramos is a 28 y.o. female who presents with complaint of dental pain #17 which began today, no abscess. Uvula midline no elevation of floor of mouth no fevers chills or trauma. Generalized poor dentition. Requesting ibuprofen. Latoya Painter PAST MEDICAL / SURGICAL / SOCIAL / FAMILY HISTORY      has a past medical history of Asthma, Bipolar disorder ([facility] [address] Cervical dysplasia, COPD (chronic obstructive pulmonary disease) ([facility] [address] Migraine, Movement disorder, and Seizures ([facility] [address] has a past surgical history that includes Cholecystectomy (2007); Tubal ligation; and Appendectomy. Social History     Socioeconomic History    Marital status:      Spouse name: Not on file    Number of children: Not on file    Years of education: Not on file    Highest education level: Not on file   Occupational History    Not on file   Social Needs    Financial resource strain: Not on file    Food insecurity     Worry: Not on file     Inability: Not on file    Transportation needs     Medical: Not on file     Non-medical: Not on file   Tobacco Use    Smoking status: Current Every Day Smoker     Packs/day: 1.00     Years: 11.00     Pack years: 11.00     Types: Cigarettes    Smokeless tobacco: Never Used   Substance and Sexual Activity    Alcohol use: Never     Alcohol/week: 0.0 standard drinks     Frequency: Never    Drug use: Not Currently     Types: Opiates , Marijuana     Comment: Hx Percocet abuse sober for 2 years.     Sexual activity: Not on file   Lifestyle    Physical activity     Days per week: Not on file     Minutes per session: Not on file    Stress: Not on file   Relationships    Social connections     Talks on phone: Not on file     Gets together: Not on file     Attends Hindu service: Not on file     Active member of club or organization: Not on file     Attends meetings of clubs or organizations: Not on file     Relationship status: Not on file    Intimate partner violence     Fear of current or ex partner: Not on file     Emotionally abused: Not on file     Physically abused: Not on file     Forced sexual activity: Not on file   Other Topics Concern    Not on file   Social History Narrative    Not on file       Family History   Problem Relation Age of Onset    Diabetes Mother         G.Parents    Hypertension Mother         G.Parents    Diabetes Father     Hypertension Father     Stroke Father         G.Parents    Cancer Other         G.Parents, Pancrease and ??    Coronary Art Dis Other         G.Parents       Allergies:  Claritin [loratadine], Diclofenac, Morphine, Nicotine, Pcn [penicillins], and Peanut-containing drug products    Home Medications:  Prior to Admission medications    Medication Sig Start Date End Date Taking? Authorizing Provider   lamoTRIgine (LAMICTAL) 25 MG tablet Take 1 tablet by mouth 2 times daily Week 1 and 2: 25 mg/day; Weeks 3 and 4: 50 mg/day; Week 5: 50 mg BID 4/7/21   Dora Aguillon DO   acetaminophen (TYLENOL) 325 MG tablet Take 1 tablet by mouth every 6 hours as needed for Pain 1/6/21   Keven Hassan MD       REVIEW OF SYSTEMS    (2-9 systems for level 4, 10 or more for level 5)      Review of Systems   Constitutional: Negative for appetite change, chills and fever. HENT: Positive for dental problem. Negative for sore throat, trouble swallowing and voice change. Eyes: Negative for photophobia. Respiratory: Negative for shortness of breath. Cardiovascular: Negative for chest pain. Gastrointestinal: Negative for abdominal pain, nausea and vomiting. Musculoskeletal: Negative for neck pain. Skin: Negative for pallor. Allergic/Immunologic: Positive for food allergies. Neurological: Negative for facial asymmetry, speech difficulty and headaches. Hematological: Negative for adenopathy. Psychiatric/Behavioral: Negative for agitation and confusion. PHYSICAL EXAM   (up to 7 for level 4, 8 or more for level 5)      INITIAL VITALS:   BP (!) 97/54   Pulse 63   Temp 98 °F (36.7 °C) (Oral)   Resp 16   LMP 04/11/2021 (Approximate)   SpO2 96%     Physical Exam  HENT:      Head: Normocephalic. Right Ear: External ear normal.      Left Ear: External ear normal.      Nose: Nose normal.      Mouth/Throat:      Comments: Poor dentition no abscess  Eyes:      Conjunctiva/sclera: Conjunctivae normal.   Neck:      Musculoskeletal: Normal range of motion. Cardiovascular:      Rate and Rhythm: Normal rate. Pulses: Normal pulses. Pulmonary:      Effort: Pulmonary effort is normal.   Abdominal:      Palpations: Abdomen is soft. Musculoskeletal: Normal range of motion. Skin:     General: Skin is warm. Capillary Refill: Capillary refill takes less than 2 seconds. Neurological:      Mental Status: She is alert and oriented to person, place, and time. Psychiatric:         Mood and Affect: Mood normal.         DIFFERENTIAL  DIAGNOSIS     PLAN (LABS / IMAGING / EKG):  No orders of the defined types were placed in this encounter.       MEDICATIONS ORDERED:  Orders Placed This Encounter   Medications    clindamycin (CLEOCIN) capsule 300 mg     Order Specific Question:   Antimicrobial Indications     Answer:   Head and Neck Infection     Order Specific Question:   Suspected Organism(s)     Answer:   dental infection    acetaminophen (TYLENOL) tablet 1,000 mg    clindamycin (CLEOCIN) 300 MG capsule     Sig: Take 1 capsule by mouth 3 times daily for 7 days     Dispense:  21 capsule     Refill:  0    Probiotic Acidophilus (FLORANEX) TABS     Sig: Take 2 tablets by mouth 2 times daily for 15 days     Dispense:  60 tablet     Refill:  0       DDX: dental pain    DIAGNOSTIC RESULTS / EMERGENCY DEPARTMENT COURSE / MDM   LAB RESULTS:  No results found for this visit on 04/17/21. IMPRESSION: alert 28 yof with sudden onset dental pain afebrile tolerating p.o. no chills normal vitals range of motion of her neck uvula is midline palate elevates symmetrically no elevation for the mouth no trismus drainable abscess offered dental block patient refused will provide antibiotics and pain medications discharged with dental follow-up    RADIOLOGY:  none    EKG  none    All EKG's are interpreted by the Emergency Department Physician who either signs or Co-signs this chart in the absence of a cardiologist.    EMERGENCY DEPARTMENT COURSE:  Seen and evaluated provided pain medication and started on antibiotics clindamycin prescription for the same as well as provided to follow-up. PROCEDURES:  none    CONSULTS:  None    CRITICAL CARE:  Please see attending note    FINAL IMPRESSION      1.  Pain, dental          DISPOSITION / PLAN     DISPOSITION  dc [place] with outpatient followup      PATIENT REFERRED TO:  [facility]  [address] [place] [address]  [phone]  Go to   As needed, If symptoms worsen    Ji Mackenziekely [facility]  [address] [facility]  [phone]  Call today        DISCHARGE MEDICATIONS:  New Prescriptions    CLINDAMYCIN (CLEOCIN) 300 MG CAPSULE    Take 1 capsule by mouth 3 times daily for 7 days    PROBIOTIC ACIDOPHILUS [facility]) TABS    Take 2 tablets by mouth 2 times daily for 15 days       Jd Randhawa DO  Emergency Medicine Resident    (Please note that portions of thisnote were completed with a voice recognition program.  Efforts were made to edit the dictations but occasionally words are mis-transcribed.)        Maikol Du Jodie Smith, DO  Resident  04/17/21 [address] Garcia, DO  Resident  04/20/21 [id number]

## 2021-10-23 ENCOUNTER — HEALTH MAINTENANCE LETTER (OUTPATIENT)
Age: 56
End: 2021-10-23

## 2022-04-09 ENCOUNTER — HEALTH MAINTENANCE LETTER (OUTPATIENT)
Age: 57
End: 2022-04-09

## 2022-07-26 ENCOUNTER — MYC MEDICAL ADVICE (OUTPATIENT)
Dept: FAMILY MEDICINE | Facility: CLINIC | Age: 57
End: 2022-07-26

## 2022-07-27 NOTE — TELEPHONE ENCOUNTER
Replied to patient and let them know they need to request refills on MyChart or to contact their pharmacy.    Juliano Lewis LPN

## 2022-08-18 DIAGNOSIS — G47.09 OTHER INSOMNIA: ICD-10-CM

## 2022-08-18 NOTE — TELEPHONE ENCOUNTER
Requested Prescriptions   Pending Prescriptions Disp Refills    temazepam (RESTORIL) 15 MG capsule [Pharmacy Med Name: TEMAZEPAM 15MG CAPS] 60 capsule 5     Sig: TAKE 1 OR 2 CAPSULES BY MOUTH AT BEDTIME AS NEEDED        There is no refill protocol information for this order           Molly Hicks, OSMANIN, RN

## 2022-08-23 RX ORDER — TEMAZEPAM 15 MG/1
CAPSULE ORAL
Qty: 60 CAPSULE | Refills: 5 | Status: SHIPPED | OUTPATIENT
Start: 2022-08-23 | End: 2024-09-24

## 2022-08-23 NOTE — TELEPHONE ENCOUNTER
Patient is due for her annual physical and labs.  Please schedule her for this appointment.  I have approved your medication.    Electronically signed by:  Fernando Fonseca M.D.  8/23/2022

## 2022-08-24 NOTE — TELEPHONE ENCOUNTER
Patient informed via mychart to schedule, 8/30 reminder if not read to send letter.  Martha River MA

## 2022-10-09 ENCOUNTER — HEALTH MAINTENANCE LETTER (OUTPATIENT)
Age: 57
End: 2022-10-09

## 2022-10-10 ENCOUNTER — MYC MEDICAL ADVICE (OUTPATIENT)
Dept: FAMILY MEDICINE | Facility: CLINIC | Age: 57
End: 2022-10-10

## 2022-10-10 DIAGNOSIS — F17.200 TOBACCO USE DISORDER: Primary | ICD-10-CM

## 2022-10-13 RX ORDER — NICOTINE 21 MG/24HR
1 PATCH, TRANSDERMAL 24 HOURS TRANSDERMAL EVERY 24 HOURS
Qty: 14 PATCH | Refills: 0 | Status: SHIPPED | OUTPATIENT
Start: 2022-10-13 | End: 2022-10-27

## 2022-10-13 RX ORDER — NICOTINE 21 MG/24HR
1 PATCH, TRANSDERMAL 24 HOURS TRANSDERMAL EVERY 24 HOURS
Qty: 14 PATCH | Refills: 0 | Status: SHIPPED | OUTPATIENT
Start: 2022-10-27 | End: 2022-11-10

## 2022-10-13 NOTE — TELEPHONE ENCOUNTER
Spoke with patient via telephone in regard to providers question. Patient states she smokes 1-1.5 pack a day. Patient asked the prescription to be sent to [facility] in [place].     Medication pended.  Routed to provider for approval.

## 2022-10-13 NOTE — TELEPHONE ENCOUNTER
I need to know how much the patient is smoking in order to dose her patches correctly.    Electronically signed by:  Fernando Fonseca M.D.  10/13/2022

## 2022-10-13 NOTE — TELEPHONE ENCOUNTER
2-week prescription for the 21 mg dose patch sent followed by a 2-week 14 mg dose patch then a month prescription for the 7 mg patch.    Electronically signed by:  Fernando Fonseca M.D.  10/13/2022

## 2023-05-21 ENCOUNTER — HEALTH MAINTENANCE LETTER (OUTPATIENT)
Age: 58
End: 2023-05-21

## 2023-06-07 ENCOUNTER — TRANSFERRED RECORDS (OUTPATIENT)
Dept: MULTI SPECIALTY CLINIC | Facility: CLINIC | Age: 58
End: 2023-06-07

## 2023-06-10 ENCOUNTER — HEALTH MAINTENANCE LETTER (OUTPATIENT)
Age: 58
End: 2023-06-10

## 2024-05-25 ENCOUNTER — HEALTH MAINTENANCE LETTER (OUTPATIENT)
Age: 59
End: 2024-05-25

## 2024-07-08 ENCOUNTER — OFFICE VISIT (OUTPATIENT)
Dept: INTERNAL MEDICINE | Facility: CLINIC | Age: 59
End: 2024-07-08
Payer: COMMERCIAL

## 2024-07-08 VITALS
TEMPERATURE: 98 F | SYSTOLIC BLOOD PRESSURE: 110 MMHG | HEART RATE: 78 BPM | DIASTOLIC BLOOD PRESSURE: 64 MMHG | HEIGHT: 68 IN | OXYGEN SATURATION: 98 % | WEIGHT: 183.3 LBS | RESPIRATION RATE: 14 BRPM | BODY MASS INDEX: 27.78 KG/M2

## 2024-07-08 DIAGNOSIS — Z12.4 CERVICAL CANCER SCREENING: ICD-10-CM

## 2024-07-08 DIAGNOSIS — F51.01 PRIMARY INSOMNIA: ICD-10-CM

## 2024-07-08 DIAGNOSIS — N39.0 E. COLI UTI: ICD-10-CM

## 2024-07-08 DIAGNOSIS — Z13.6 CARDIOVASCULAR SCREENING; LDL GOAL LESS THAN 100: ICD-10-CM

## 2024-07-08 DIAGNOSIS — Z12.31 ENCOUNTER FOR SCREENING MAMMOGRAM FOR BREAST CANCER: ICD-10-CM

## 2024-07-08 DIAGNOSIS — Z00.00 ENCOUNTER FOR ROUTINE ADULT HEALTH EXAMINATION WITHOUT ABNORMAL FINDINGS: Primary | ICD-10-CM

## 2024-07-08 DIAGNOSIS — N95.1 MENOPAUSAL SYNDROME (HOT FLASHES): ICD-10-CM

## 2024-07-08 DIAGNOSIS — B96.20 E. COLI UTI: ICD-10-CM

## 2024-07-08 DIAGNOSIS — R82.90 ABNORMAL URINE ODOR: ICD-10-CM

## 2024-07-08 LAB
ALBUMIN SERPL BCG-MCNC: 4.3 G/DL (ref 3.5–5.2)
ALBUMIN UR-MCNC: NEGATIVE MG/DL
ALP SERPL-CCNC: 91 U/L (ref 40–150)
ALT SERPL W P-5'-P-CCNC: 17 U/L (ref 0–50)
ANION GAP SERPL CALCULATED.3IONS-SCNC: 11 MMOL/L (ref 7–15)
APPEARANCE UR: ABNORMAL
AST SERPL W P-5'-P-CCNC: 18 U/L (ref 0–45)
BACTERIA #/AREA URNS HPF: ABNORMAL /HPF
BILIRUB SERPL-MCNC: 0.3 MG/DL
BILIRUB UR QL STRIP: NEGATIVE
BUN SERPL-MCNC: 19.9 MG/DL (ref 6–20)
CALCIUM SERPL-MCNC: 9.8 MG/DL (ref 8.6–10)
CHLORIDE SERPL-SCNC: 104 MMOL/L (ref 98–107)
CHOLEST SERPL-MCNC: 245 MG/DL
COLOR UR AUTO: YELLOW
CREAT SERPL-MCNC: 0.77 MG/DL (ref 0.51–0.95)
DEPRECATED HCO3 PLAS-SCNC: 24 MMOL/L (ref 22–29)
EGFRCR SERPLBLD CKD-EPI 2021: 89 ML/MIN/1.73M2
FASTING STATUS PATIENT QL REPORTED: NO
FASTING STATUS PATIENT QL REPORTED: NO
GLUCOSE SERPL-MCNC: 85 MG/DL (ref 70–99)
GLUCOSE UR STRIP-MCNC: NEGATIVE MG/DL
HDLC SERPL-MCNC: 51 MG/DL
HGB UR QL STRIP: ABNORMAL
KETONES UR STRIP-MCNC: NEGATIVE MG/DL
LDLC SERPL CALC-MCNC: 136 MG/DL
LEUKOCYTE ESTERASE UR QL STRIP: NEGATIVE
MUCOUS THREADS #/AREA URNS LPF: PRESENT /LPF
NITRATE UR QL: POSITIVE
NONHDLC SERPL-MCNC: 194 MG/DL
PH UR STRIP: 5 [PH] (ref 5–7)
POTASSIUM SERPL-SCNC: 4.1 MMOL/L (ref 3.4–5.3)
PROT SERPL-MCNC: 7.3 G/DL (ref 6.4–8.3)
RBC URINE: <1 /HPF
SODIUM SERPL-SCNC: 139 MMOL/L (ref 135–145)
SP GR UR STRIP: 1.01 (ref 1–1.03)
SQUAMOUS EPITHELIAL: <1 /HPF
TRIGL SERPL-MCNC: 291 MG/DL
UROBILINOGEN UR STRIP-MCNC: NORMAL MG/DL
WBC URINE: 1 /HPF

## 2024-07-08 PROCEDURE — 80053 COMPREHEN METABOLIC PANEL: CPT | Performed by: INTERNAL MEDICINE

## 2024-07-08 PROCEDURE — 87624 HPV HI-RISK TYP POOLED RSLT: CPT | Performed by: INTERNAL MEDICINE

## 2024-07-08 PROCEDURE — 87086 URINE CULTURE/COLONY COUNT: CPT | Performed by: INTERNAL MEDICINE

## 2024-07-08 PROCEDURE — 99386 PREV VISIT NEW AGE 40-64: CPT | Performed by: INTERNAL MEDICINE

## 2024-07-08 PROCEDURE — 81001 URINALYSIS AUTO W/SCOPE: CPT | Performed by: INTERNAL MEDICINE

## 2024-07-08 PROCEDURE — G0145 SCR C/V CYTO,THINLAYER,RESCR: HCPCS | Performed by: INTERNAL MEDICINE

## 2024-07-08 PROCEDURE — 80061 LIPID PANEL: CPT | Performed by: INTERNAL MEDICINE

## 2024-07-08 PROCEDURE — 99214 OFFICE O/P EST MOD 30 MIN: CPT | Mod: 25 | Performed by: INTERNAL MEDICINE

## 2024-07-08 PROCEDURE — 87186 SC STD MICRODIL/AGAR DIL: CPT | Performed by: INTERNAL MEDICINE

## 2024-07-08 PROCEDURE — 99207 URINE CULTURE: CPT | Performed by: INTERNAL MEDICINE

## 2024-07-08 PROCEDURE — 36415 COLL VENOUS BLD VENIPUNCTURE: CPT | Performed by: INTERNAL MEDICINE

## 2024-07-08 RX ORDER — TRAZODONE HYDROCHLORIDE 50 MG/1
50 TABLET, FILM COATED ORAL AT BEDTIME
Qty: 90 TABLET | Refills: 3 | Status: SHIPPED | OUTPATIENT
Start: 2024-07-08

## 2024-07-08 SDOH — HEALTH STABILITY: PHYSICAL HEALTH: ON AVERAGE, HOW MANY MINUTES DO YOU ENGAGE IN EXERCISE AT THIS LEVEL?: 0 MIN

## 2024-07-08 SDOH — HEALTH STABILITY: PHYSICAL HEALTH: ON AVERAGE, HOW MANY DAYS PER WEEK DO YOU ENGAGE IN MODERATE TO STRENUOUS EXERCISE (LIKE A BRISK WALK)?: 0 DAYS

## 2024-07-08 ASSESSMENT — SOCIAL DETERMINANTS OF HEALTH (SDOH): HOW OFTEN DO YOU GET TOGETHER WITH FRIENDS OR RELATIVES?: ONCE A WEEK

## 2024-07-08 NOTE — PROGRESS NOTES
"Preventive Care Visit  [facility]  Anjum Ross MD, Internal Medicine  Jul 8, 2024      Assessment & Plan   Problem List Items Addressed This Visit       Menopausal syndrome (hot flashes)     Other Visit Diagnoses       Encounter for routine adult health examination without abnormal findings    -  Primary    Cervical cancer screening        Relevant Orders    Pap Screen with HPV - Recommended Age 30 - 65 Years    Primary insomnia        Relevant Medications    traZODone (DESYREL) 50 MG tablet    Encounter for screening mammogram for breast cancer        Relevant Orders    MA Screen Bilateral w/Raudel    CARDIOVASCULAR SCREENING; LDL GOAL LESS THAN 100        Relevant Orders    Lipid panel reflex to direct LDL Fasting    Comprehensive metabolic panel (BMP + Alb, Alk Phos, ALT, AST, Total. Bili, TP)    Abnormal urine odor        Relevant Orders    UA Macroscopic with reflex to Microscopic and Culture - Lab Collect           Patient is here for yearly physical.  Her primary has left the system.    She works at the [employer],  but now dating a new person.  No concerns about STDs.  We will do a cervical cancer screening with a Pap today.    Screening mammogram was ordered  Screening lipids and comprehensive panel.    Abnormal urine order recently we will check a urinalysis.    Insomnia for many years we will try her on trazodone continue her melatonin.             BMI  Estimated body mass index is 27.78 kg/m  as calculated from the following:    Height as of this encounter: 1.73 m (5' 8.11\").    Weight as of this encounter: 83.1 kg (183 lb 4.8 oz).       Counseling  Appropriate preventive services were discussed with this patient, including applicable screening as appropriate for fall prevention, nutrition, physical activity, Tobacco-use cessation, weight loss and cognition.  Checklist reviewing preventive services available has been given to the patient.  Reviewed patient's diet, " addressing concerns and/or questions.     FUTURE APPOINTMENTS:       - Follow-up for annual visit or as needed      Subjective   [place] is a 58 year old, presenting for the following:  Physical        7/8/2024     7:33 AM   Additional Questions   Roomed by Nancy osman        Via the Health Maintenance questionnaire, the patient has reported the following services have been completed -Mammogram: clinic 2023-06-07, this information has been sent to the abstraction team.  Health Care Directive  Patient does not have a Health Care Directive or Living Will: Discussed advance care planning with patient; information given to patient to review.    HPI    , lives alone. Lives in [place].  Works at [employer].     Quit smoking two years ago. Quit in 2022 with the patch.     Needs something for sleep, used to have temazepam in the past. Started with her  passing a way 6 years ago. Doesn't feel depressed.             7/8/2024   General Health   How would you rate your overall physical health? Good   Feel stress (tense, anxious, or unable to sleep) Very much      (!) STRESS CONCERN      7/8/2024   Nutrition   Three or more servings of calcium each day? (!) NO   Diet: Regular (no restrictions)   How many servings of fruit and vegetables per day? (!) 0-1   How many sweetened beverages each day? 0-1            7/8/2024   Exercise   Days per week of moderate/strenous exercise 0 days   Average minutes spent exercising at this level 0 min      (!) EXERCISE CONCERN      7/8/2024   Social Factors   Frequency of gathering with friends or relatives Once a week   Worry food won't last until get money to buy more No   Food not last or not have enough money for food? No   Do you have housing? (Housing is defined as stable permanent housing and does not include staying ouside in a car, in a tent, in an abandoned building, in an overnight shelter, or couch-surfing.) Yes   Are you worried about losing your housing? No   Lack of  transportation? No   Unable to get utilities (heat,electricity)? No            7/8/2024   Fall Risk   Fallen 2 or more times in the past year? No   Trouble with walking or balance? No             7/8/2024   Dental   Dentist two times every year? Yes            7/8/2024   TB Screening   Were you born outside of the [place]? No            Today's PHQ-2 Score:       7/8/2024     7:23 AM   PHQ-2 ( 1999 [employer])   Q1: Little interest or pleasure in doing things 0   Q2: Feeling down, depressed or hopeless 0   PHQ-2 Score 0   Q1: Little interest or pleasure in doing things Not at all   Q2: Feeling down, depressed or hopeless Not at all   PHQ-2 Score 0           7/8/2024   Substance Use   Alcohol more than 3/day or more than 7/wk No   Do you use any other substances recreationally? No        Social History     Tobacco Use    Smoking status: Every Day     Current packs/day: 1.00     Average packs/day: 1 pack/day for 15.0 years (15.0 ttl pk-yrs)     Types: Cigarettes    Smokeless tobacco: Never   Substance Use Topics    Alcohol use: Yes     Comment: once per year    Drug use: No      Mammogram Screening - Mammogram every 1-2 years updated in Health Maintenance based on mutual decision making        7/8/2024   STI Screening   New sexual partner(s) since last STI/HIV test? (!) YES         History of abnormal Pap smear: No - age 30- 64 PAP with HPV every 5 years recommended        Latest Ref Rng & Units 4/26/2017     4:08 PM 4/26/2017     3:28 PM 9/4/2007    12:00 AM   PAP / HPV   PAP (Historical)   NIL  OTHER-NIL EM>40    HPV 16 DNA NEG Negative      HPV 18 DNA NEG Negative      Other HR HPV NEG Negative        ASCVD Risk   The ASCVD Risk score (Karolina STOVER, et al., 2019) failed to calculate for the following reasons:    Cannot find a previous HDL lab    Cannot find a previous total cholesterol lab    The BP treatment status is invalid                        Lab work is in process      Review of Systems  CONSTITUTIONAL:  "NEGATIVE for fever, chills, change in weight  INTEGUMENTARY/SKIN: NEGATIVE for worrisome rashes, moles or lesions  EYES: NEGATIVE for vision changes or irritation  ENT/MOUTH: NEGATIVE for ear, mouth and throat problems  RESP: NEGATIVE for significant cough or SOB  BREAST: NEGATIVE for masses, tenderness or discharge  CV: NEGATIVE for chest pain, palpitations or peripheral edema  GI: NEGATIVE for nausea, abdominal pain, heartburn, or change in bowel habits  : NEGATIVE for frequency, dysuria, or hematuria  MUSCULOSKELETAL: NEGATIVE for significant arthralgias or myalgia  NEURO: NEGATIVE for weakness, dizziness or paresthesias  ENDOCRINE: NEGATIVE for temperature intolerance, skin/hair changes  HEME: NEGATIVE for bleeding problems  PSYCHIATRIC: NEGATIVE for changes in mood or affect     Objective    Exam  /64   Pulse 78   Temp 98  F (36.7  C)   Resp 14   Ht 1.73 m (5' 8.11\")   Wt 83.1 kg (183 lb 4.8 oz)   LMP 05/12/2014 (Approximate)   SpO2 98%   BMI 27.78 kg/m     Estimated body mass index is 27.78 kg/m  as calculated from the following:    Height as of this encounter: 1.73 m (5' 8.11\").    Weight as of this encounter: 83.1 kg (183 lb 4.8 oz).    Physical Exam  GENERAL: alert and no distress  EYES: Eyes grossly normal to inspection, PERRL and conjunctivae and sclerae normal  HENT: ear canals and TM's normal, nose and mouth without ulcers or lesions  NECK: no adenopathy, no asymmetry, masses, or scars  RESP: lungs clear to auscultation - no rales, rhonchi or wheezes  CV: regular rate and rhythm, normal S1 S2, no S3 or S4, no murmur, click or rub, no peripheral edema  ABDOMEN: soft, nontender, no hepatosplenomegaly, no masses and bowel sounds normal   (female) w/bimanual: normal female external genitalia, normal urethral meatus, normal vaginal mucosa, and normal cervix/adnexa/uterus without masses or discharge   (female): Nancy Garcia present for pap smear  MS: no gross musculoskeletal defects noted, " no edema  SKIN: no suspicious lesions or rashes  NEURO: Normal strength and tone, mentation intact and speech normal  PSYCH: mentation appears normal, affect normal/bright        Signed Electronically by: Anjum Ross MD     98

## 2024-07-09 LAB
HPV HR 12 DNA CVX QL NAA+PROBE: POSITIVE
HPV16 DNA CVX QL NAA+PROBE: NEGATIVE
HPV18 DNA CVX QL NAA+PROBE: NEGATIVE
HUMAN PAPILLOMA VIRUS FINAL DIAGNOSIS: ABNORMAL

## 2024-07-10 RX ORDER — NITROFURANTOIN 25; 75 MG/1; MG/1
100 CAPSULE ORAL 2 TIMES DAILY
Qty: 14 CAPSULE | Refills: 0 | Status: SHIPPED | OUTPATIENT
Start: 2024-07-10 | End: 2024-09-24

## 2024-07-11 ENCOUNTER — MYC MEDICAL ADVICE (OUTPATIENT)
Dept: INTERNAL MEDICINE | Facility: CLINIC | Age: 59
End: 2024-07-11
Payer: COMMERCIAL

## 2024-07-11 LAB
BKR LAB AP GYN ADEQUACY: NORMAL
BKR LAB AP GYN INTERPRETATION: NORMAL
BKR LAB AP PREVIOUS ABNORMAL: NORMAL
PATH REPORT.COMMENTS IMP SPEC: NORMAL
PATH REPORT.COMMENTS IMP SPEC: NORMAL
PATH REPORT.RELEVANT HX SPEC: NORMAL

## 2024-07-12 ENCOUNTER — PATIENT OUTREACH (OUTPATIENT)
Dept: FAMILY MEDICINE | Facility: CLINIC | Age: 59
End: 2024-07-12
Payer: COMMERCIAL

## 2024-07-12 PROBLEM — R87.810 CERVICAL HIGH RISK HPV (HUMAN PAPILLOMAVIRUS) TEST POSITIVE: Status: ACTIVE | Noted: 2024-07-08

## 2024-07-12 LAB
BACTERIA UR CULT: ABNORMAL
BACTERIA UR CULT: ABNORMAL

## 2024-07-15 ENCOUNTER — HOSPITAL ENCOUNTER (OUTPATIENT)
Dept: MAMMOGRAPHY | Facility: CLINIC | Age: 59
Discharge: HOME | End: 2024-07-15
Attending: INTERNAL MEDICINE | Admitting: INTERNAL MEDICINE
Payer: COMMERCIAL

## 2024-07-15 DIAGNOSIS — Z12.31 ENCOUNTER FOR SCREENING MAMMOGRAM FOR BREAST CANCER: ICD-10-CM

## 2024-07-15 PROCEDURE — 77063 BREAST TOMOSYNTHESIS BI: CPT

## 2024-09-23 ENCOUNTER — TELEPHONE (OUTPATIENT)
Dept: INTERNAL MEDICINE | Facility: CLINIC | Age: 59
End: 2024-09-23
Payer: COMMERCIAL

## 2024-09-23 NOTE — TELEPHONE ENCOUNTER
Pt states has back pain headaches, sinus issues after  urgent care needs to see primary asap is very uncomfortable  please advise hoping to see before oct 16  may leave a message

## 2024-09-23 NOTE — TELEPHONE ENCOUNTER
Attempted to help schedule patient. LVM to have patient call back. Dr. Ross doesn't have any availability prior to patients hosp follow up, writer was going to offer same day/next slot with other provider.    Lalita Bowman, VF 9/23/2024

## 2024-09-24 ENCOUNTER — OFFICE VISIT (OUTPATIENT)
Dept: FAMILY MEDICINE | Facility: CLINIC | Age: 59
End: 2024-09-24
Payer: COMMERCIAL

## 2024-09-24 VITALS
RESPIRATION RATE: 16 BRPM | WEIGHT: 181 LBS | BODY MASS INDEX: 27.43 KG/M2 | HEART RATE: 106 BPM | DIASTOLIC BLOOD PRESSURE: 66 MMHG | HEIGHT: 68 IN | TEMPERATURE: 97.5 F | SYSTOLIC BLOOD PRESSURE: 114 MMHG | OXYGEN SATURATION: 98 %

## 2024-09-24 DIAGNOSIS — J30.2 SEASONAL ALLERGIC RHINITIS, UNSPECIFIED TRIGGER: ICD-10-CM

## 2024-09-24 DIAGNOSIS — G44.209 TENSION HEADACHE: Primary | ICD-10-CM

## 2024-09-24 PROCEDURE — 99213 OFFICE O/P EST LOW 20 MIN: CPT | Performed by: NURSE PRACTITIONER

## 2024-09-24 PROCEDURE — G2211 COMPLEX E/M VISIT ADD ON: HCPCS | Performed by: NURSE PRACTITIONER

## 2024-09-24 RX ORDER — DOXYCYCLINE 100 MG/1
CAPSULE ORAL
COMMUNITY
Start: 2024-09-17

## 2024-09-24 RX ORDER — TIZANIDINE 2 MG/1
2-4 TABLET ORAL 3 TIMES DAILY PRN
Qty: 30 TABLET | Refills: 3 | Status: SHIPPED | OUTPATIENT
Start: 2024-09-24

## 2024-09-24 ASSESSMENT — PAIN SCALES - GENERAL: PAINLEVEL: MILD PAIN (2)

## 2024-09-24 NOTE — PROGRESS NOTES
Assessment & Plan     Tension headache  Symptoms most consistent with tension headache given muscle tension in the head and neck. Exam supports this as well. Discussed muscle relaxer and NSAIDs for her symptoms. Also encouraged supportive cares with heat/ice application, stretching, massage and topical therapies. Use of Tizanidine reviewed, side effects reviewed. She has not had improvement with the antibiotics but has had improvement with the pseudoephedrine. We will have her continue the antihistamine with decongestant given this. Follow-up with PCP in 2-3 weeks as scheduled.   If symptoms do not improve, can consider PT referral.     - tiZANidine (ZANAFLEX) 2 MG tablet; Take 1-2 tablets (2-4 mg) by mouth 3 times daily as needed for muscle spasms.    Seasonal allergic rhinitis, unspecified trigger  - loratadine-pseudoePHEDrine (CLARITIN-D 24-HOUR)  MG 24 hr tablet; Take 1 tablet by mouth daily.    I explained my diagnostic considerations and recommendations to the patient, who voiced understanding and agreement with the assessment and treatment plan. All questions were answered to patient's apparent satisfaction. We discussed potential side effects of any prescribed or recommended therapies, as well as expectations for response to treatments and importance of lifestyle measures that may improve symptoms. Patient was advised to contact our office if there are new symptoms or no improvement or worsening of conditions or symptoms.    The longitudinal plan of care for the diagnosis(es)/condition(s) as documented were addressed during this visit. Due to the added complexity in care, I will continue to support Richmond in the subsequent management and with ongoing continuity of care.        Darron Fragoso is a 59 year old, presenting for the following health issues:  Sinus Problem        9/24/2024    11:09 AM   Additional Questions   Roomed by Galilea ESCALANTE     History of Present Illness       Reason for visit:   Sinus infection, [facility] and put on doxycycline and not helping  Symptom onset:  3-7 days ago  Symptoms include:  Headaches  Symptom intensity:  Moderate      Richmnod Montanez is a 59 year old female patient who presents to the clinic today for evaluation of 1 month worth of sinus pain and pressure. Pain is mostly located in around her eyes and head. She has not had significant nasal drainage or congestion. She has tried OTC Sudafed which has been helpful. She has otherwise tried Advil which did help as well. Her symptoms are worse when up and moving and improves when laying flat. She was seen in Urgent Care last week and started on Doxycycline. She has two pills left and does not feel it has been helpful.     Associated symptoms:   Fever/Chills: No   Sore throat: No   Nasal congestion: No   Ear pain: Resolved   Cough: No   Teeth pain: No    Patient Active Problem List   Diagnosis    Female infertility    Personal history of tobacco use, presenting hazards to health    Deviated nasal septum    Dyspnea and respiratory abnormality    Situational depression    DIDI (generalized anxiety disorder)    Menopausal syndrome (hot flashes)    Other insomnia    Chest pressure    Adjustment disorder with mixed anxiety and depressed mood    Urinary tract infection without hematuria, site unspecified    Nocturia    Positive colorectal cancer screening using Cologuard test    Cervical high risk HPV (human papillomavirus) test positive     Current Outpatient Medications   Medication Sig Dispense Refill    doxycycline hyclate (VIBRAMYCIN) 100 MG capsule TAKE ONE CAPSULE BY MOUTH TWICE A DAY WITH FOOD FOR 7 DAYS      temazepam (RESTORIL) 15 MG capsule TAKE 1 OR 2 CAPSULES BY MOUTH AT BEDTIME AS NEEDED 60 capsule 5    traZODone (DESYREL) 50 MG tablet Take 1 tablet (50 mg) by mouth at bedtime 90 tablet 3    nitroFURantoin macrocrystal-monohydrate (MACROBID) 100 MG capsule Take 1 capsule (100 mg) by mouth 2 times  "daily (Patient not taking: Reported on 9/24/2024) 14 capsule 0     No current facility-administered medications for this visit.       Review of Systems  Constitutional, HEENT, cardiovascular, pulmonary, gi and gu systems are negative, except as otherwise noted.      Objective    /66   Pulse 106   Temp 97.5  F (36.4  C) (Temporal)   Resp 16   Ht 1.73 m (5' 8.11\")   Wt 82.1 kg (181 lb)   LMP 05/12/2014 (Approximate)   SpO2 98%   BMI 27.43 kg/m    Body mass index is 27.43 kg/m .  Physical Exam  Vitals reviewed.   Constitutional:       General: She is not in acute distress.     Appearance: Normal appearance.   HENT:      Head: Normocephalic and atraumatic.      Right Ear: There is impacted cerumen.      Left Ear: Tympanic membrane normal.      Nose: Nose normal.      Mouth/Throat:      Mouth: Mucous membranes are moist.      Pharynx: Oropharynx is clear.   Eyes:      Extraocular Movements: Extraocular movements intact.      Conjunctiva/sclera: Conjunctivae normal.   Neck:      Comments: Tenderness with muscular palpation in bilateral shoulders and neck, extending to occipital notch  Cardiovascular:      Rate and Rhythm: Normal rate and regular rhythm.      Heart sounds: Normal heart sounds.   Pulmonary:      Effort: Pulmonary effort is normal.      Breath sounds: Normal breath sounds.   Musculoskeletal:      Cervical back: Neck supple. Muscular tenderness present. No pain with movement. Decreased range of motion.   Lymphadenopathy:      Cervical: No cervical adenopathy.   Skin:     General: Skin is warm and dry.      Capillary Refill: Capillary refill takes less than 2 seconds.   Neurological:      Mental Status: She is alert and oriented to person, place, and time. Mental status is at baseline.   Psychiatric:         Mood and Affect: Mood normal.         Behavior: Behavior normal.                    Signed Electronically by: Ana M Hubbadr NP    "

## 2024-09-28 ENCOUNTER — HOSPITAL ENCOUNTER (EMERGENCY)
Facility: CLINIC | Age: 59
Discharge: HOME | End: 2024-09-29
Attending: FAMILY MEDICINE | Admitting: FAMILY MEDICINE
Payer: COMMERCIAL

## 2024-09-28 DIAGNOSIS — G44.209 TENSION HEADACHE: ICD-10-CM

## 2024-09-28 DIAGNOSIS — G62.9 NEUROPATHY: ICD-10-CM

## 2024-09-28 PROCEDURE — 99285 EMERGENCY DEPT VISIT HI MDM: CPT | Mod: 25

## 2024-09-28 PROCEDURE — 96374 THER/PROPH/DIAG INJ IV PUSH: CPT

## 2024-09-28 PROCEDURE — 99285 EMERGENCY DEPT VISIT HI MDM: CPT | Performed by: FAMILY MEDICINE

## 2024-09-28 ASSESSMENT — COLUMBIA-SUICIDE SEVERITY RATING SCALE - C-SSRS
1. IN THE PAST MONTH, HAVE YOU WISHED YOU WERE DEAD OR WISHED YOU COULD GO TO SLEEP AND NOT WAKE UP?: NO
2. HAVE YOU ACTUALLY HAD ANY THOUGHTS OF KILLING YOURSELF IN THE PAST MONTH?: NO
6. HAVE YOU EVER DONE ANYTHING, STARTED TO DO ANYTHING, OR PREPARED TO DO ANYTHING TO END YOUR LIFE?: NO

## 2024-09-29 ENCOUNTER — APPOINTMENT (OUTPATIENT)
Dept: CT IMAGING | Facility: CLINIC | Age: 59
End: 2024-09-29
Attending: FAMILY MEDICINE
Payer: COMMERCIAL

## 2024-09-29 VITALS
TEMPERATURE: 97.6 F | DIASTOLIC BLOOD PRESSURE: 67 MMHG | HEART RATE: 78 BPM | RESPIRATION RATE: 18 BRPM | OXYGEN SATURATION: 96 % | SYSTOLIC BLOOD PRESSURE: 115 MMHG

## 2024-09-29 LAB
ANION GAP SERPL CALCULATED.3IONS-SCNC: 12 MMOL/L (ref 7–15)
BASOPHILS # BLD AUTO: 0.1 10E3/UL (ref 0–0.2)
BASOPHILS NFR BLD AUTO: 1 %
BUN SERPL-MCNC: 23.4 MG/DL (ref 8–23)
CALCIUM SERPL-MCNC: 9.7 MG/DL (ref 8.8–10.4)
CHLORIDE SERPL-SCNC: 103 MMOL/L (ref 98–107)
CREAT SERPL-MCNC: 0.97 MG/DL (ref 0.51–0.95)
EGFRCR SERPLBLD CKD-EPI 2021: 67 ML/MIN/1.73M2
EOSINOPHIL # BLD AUTO: 0.2 10E3/UL (ref 0–0.7)
EOSINOPHIL NFR BLD AUTO: 3 %
ERYTHROCYTE [DISTWIDTH] IN BLOOD BY AUTOMATED COUNT: 12.6 % (ref 10–15)
GLUCOSE SERPL-MCNC: 109 MG/DL (ref 70–99)
HCO3 SERPL-SCNC: 25 MMOL/L (ref 22–29)
HCT VFR BLD AUTO: 40.7 % (ref 35–47)
HGB BLD-MCNC: 14 G/DL (ref 11.7–15.7)
HOLD SPECIMEN: NORMAL
IMM GRANULOCYTES # BLD: 0.1 10E3/UL
IMM GRANULOCYTES NFR BLD: 1 %
LYMPHOCYTES # BLD AUTO: 2.4 10E3/UL (ref 0.8–5.3)
LYMPHOCYTES NFR BLD AUTO: 33 %
MAGNESIUM SERPL-MCNC: 1.8 MG/DL (ref 1.7–2.3)
MCH RBC QN AUTO: 28.7 PG (ref 26.5–33)
MCHC RBC AUTO-ENTMCNC: 34.4 G/DL (ref 31.5–36.5)
MCV RBC AUTO: 84 FL (ref 78–100)
MONOCYTES # BLD AUTO: 0.5 10E3/UL (ref 0–1.3)
MONOCYTES NFR BLD AUTO: 7 %
NEUTROPHILS # BLD AUTO: 4 10E3/UL (ref 1.6–8.3)
NEUTROPHILS NFR BLD AUTO: 55 %
NRBC # BLD AUTO: 0 10E3/UL
NRBC BLD AUTO-RTO: 0 /100
PLATELET # BLD AUTO: 304 10E3/UL (ref 150–450)
POTASSIUM SERPL-SCNC: 3.7 MMOL/L (ref 3.4–5.3)
RBC # BLD AUTO: 4.87 10E6/UL (ref 3.8–5.2)
SODIUM SERPL-SCNC: 140 MMOL/L (ref 135–145)
TROPONIN T SERPL HS-MCNC: 8 NG/L
WBC # BLD AUTO: 7.3 10E3/UL (ref 4–11)

## 2024-09-29 PROCEDURE — 36415 COLL VENOUS BLD VENIPUNCTURE: CPT | Performed by: FAMILY MEDICINE

## 2024-09-29 PROCEDURE — 250N000011 HC RX IP 250 OP 636: Performed by: FAMILY MEDICINE

## 2024-09-29 PROCEDURE — 80048 BASIC METABOLIC PNL TOTAL CA: CPT | Performed by: FAMILY MEDICINE

## 2024-09-29 PROCEDURE — 83735 ASSAY OF MAGNESIUM: CPT | Performed by: FAMILY MEDICINE

## 2024-09-29 PROCEDURE — 85025 COMPLETE CBC W/AUTO DIFF WBC: CPT | Performed by: FAMILY MEDICINE

## 2024-09-29 PROCEDURE — 72131 CT LUMBAR SPINE W/O DYE: CPT

## 2024-09-29 PROCEDURE — 84484 ASSAY OF TROPONIN QUANT: CPT | Performed by: FAMILY MEDICINE

## 2024-09-29 PROCEDURE — 70450 CT HEAD/BRAIN W/O DYE: CPT

## 2024-09-29 RX ORDER — LORAZEPAM 2 MG/ML
1 INJECTION INTRAMUSCULAR ONCE
Status: COMPLETED | OUTPATIENT
Start: 2024-09-29 | End: 2024-09-29

## 2024-09-29 RX ORDER — LORAZEPAM 1 MG/1
0.5 TABLET ORAL EVERY 6 HOURS PRN
Qty: 5 TABLET | Refills: 0 | Status: SHIPPED | OUTPATIENT
Start: 2024-09-29

## 2024-09-29 RX ADMIN — LORAZEPAM 1 MG: 2 INJECTION INTRAMUSCULAR; INTRAVENOUS at 00:21

## 2024-09-29 ASSESSMENT — ACTIVITIES OF DAILY LIVING (ADL): ADLS_ACUITY_SCORE: 35

## 2024-09-29 NOTE — DISCHARGE INSTRUCTIONS
1.  Use the Ativan as needed to see if this helps with your symptoms of your headache and the tingling that you are having.  I would not take this with your Zanaflex.  Please see your doctor this week if symptoms continue for further workup on your symptoms.

## 2024-09-29 NOTE — ED TRIAGE NOTES
Presents with a multitude of symptoms including dizziness, intermittent numbness of all extremities, some off and on midsternal chest tightness that has been ongoing for a month, and L neck/shoulder pain also going on for a while. Was seen in UC and by PCP, given antibiotics then told to stop taking them, then given zanaflex and claritin-d. States zanalfex helps when she takes it for the shoulder/neck pain. Denies chest pain now, report it happened earlier today.     Triage Assessment (Adult)       Row Name 09/28/24 5486          Triage Assessment    Airway WDL WDL        Respiratory WDL    Respiratory WDL WDL        Skin Circulation/Temperature WDL    Skin Circulation/Temperature WDL WDL        Cardiac WDL    Cardiac WDL WDL        Peripheral/Neurovascular WDL    Peripheral Neurovascular WDL X;neurovascular assessment upper;neurovascular assessment lower        Cognitive/Neuro/Behavioral WDL    Cognitive/Neuro/Behavioral WDL WDL        LUE Neurovascular Assessment    Temperature LUE warm     Color LUE no discoloration     Sensation LUE numbness present  intermittent numbness        RUE Neurovascular Assessment    Temperature RUE warm     Color RUE no discoloration     Sensation RUE numbness present  intermittent numbness

## 2024-09-29 NOTE — ED PROVIDER NOTES
History     Chief Complaint   Patient presents with    Dizziness     HPI  Richmond Montanez is a 59 year old female who presents with concerns of bilateral leg numbness that started a few hours ago, she is also having alternating numbness of her arms.  Patient has been dealing with some headaches for the last few days, went to urgent care they thought she had a sinus infection put her on antibiotics but then stopped it after well.  Patient has been having some off-and-on dizziness.  Patient states that she has had some off-and-on chest discomfort.  Denies any nausea or any vomiting.  Patient states that she has been eating and drinking normally.  Denies any recent dysuria or hematuria.    Allergies:  Allergies   Allergen Reactions    Doxycycline Nausea and Vomiting    Penicillins     Sulfa Antibiotics        Problem List:    Patient Active Problem List    Diagnosis Date Noted    Cervical high risk HPV (human papillomavirus) test positive 07/08/2024     Priority: Medium     4/26/17 NIL, Neg HPV  7/8/24 NIL pap, +HR HPV, not 16/18. Plan 1 yr co-test  07/12/24 Left msg and Mychart result note sent. Seen by patient Richmond Montanez on 7/12/2024  9:28 AM           Positive colorectal cancer screening using Cologuard test 03/09/2021     Priority: Medium    Urinary tract infection without hematuria, site unspecified 02/01/2021     Priority: Medium    Nocturia 02/01/2021     Priority: Medium    Adjustment disorder with mixed anxiety and depressed mood 02/28/2019     Priority: Medium    Situational depression 01/10/2019     Priority: Medium    DIDI (generalized anxiety disorder) 01/10/2019     Priority: Medium    Menopausal syndrome (hot flashes) 01/10/2019     Priority: Medium    Other insomnia 01/10/2019     Priority: Medium    Chest pressure 01/10/2019     Priority: Medium    Deviated nasal septum 04/17/2008     Priority: Medium    Dyspnea and respiratory abnormality 04/17/2008     Priority: Medium     Problem list name  updated by automated process. Provider to review      Personal history of tobacco use, presenting hazards to health 04/15/2002     Priority: Medium    Female infertility      Priority: Medium     Problem list name updated by automated process. Provider to review          Past Medical History:    Past Medical History:   Diagnosis Date    Female infertility of unspecified origin        Past Surgical History:    Past Surgical History:   Procedure Laterality Date     REPAIR OF NASAL SEPTUM  04/22/08    Septoplasty. Resect inferior turbinates, Partial uvulectomy.       Family History:    Family History   Problem Relation Age of Onset    Arthritis Mother     Diabetes Mother         insulin type II    Thyroid Disease Mother         hypo    EYE* Mother         glaucoma    Lung Cancer Mother         d/c 78    Coronary Artery Disease Mother         triple bypass and valve replacement    Cancer Father         Cancer from asbestos exposure 1999 (Mesothelioma)    Hypertension Father     Kidney Disease Sister         Kidney transplant 2/2 kidney failure due to NSAID use    Other - See Comments Daughter         1991    Alcohol/Drug No family hx of     Allergies No family hx of     Alzheimer Disease No family hx of     Anesthesia Reaction No family hx of     Blood Disease No family hx of     Depression No family hx of     Genetic Disorder No family hx of     Gastrointestinal Disease No family hx of     Genitourinary Problems No family hx of     Gynecology No family hx of     Heart Disease No family hx of     Lipids No family hx of     Neurologic Disorder No family hx of     Osteoporosis No family hx of     Psychotic Disorder No family hx of     Respiratory No family hx of     Cerebrovascular Disease No family hx of     Obesity No family hx of        Social History:  Marital Status:   [5]  Social History     Tobacco Use    Smoking status: Former     Current packs/day: 0.00     Average packs/day: 1 pack/day for 15.0 years  (15.0 ttl pk-yrs)     Types: Cigarettes     Quit date:      Years since quittin.7    Smokeless tobacco: Never   Vaping Use    Vaping status: Never Used   Substance Use Topics    Alcohol use: Yes     Comment: once per year    Drug use: No        Medications:    doxycycline hyclate (VIBRAMYCIN) 100 MG capsule  loratadine-pseudoePHEDrine (CLARITIN-D 24-HOUR)  MG 24 hr tablet  tiZANidine (ZANAFLEX) 2 MG tablet  traZODone (DESYREL) 50 MG tablet          Review of Systems   All other systems reviewed and are negative.      Physical Exam   BP: (!) 173/81  Pulse: 83  Temp: 97.6  F (36.4  C)  Resp: 18  SpO2: 97 %      Physical Exam  Vitals and nursing note reviewed.   Constitutional:       General: She is not in acute distress.     Appearance: She is well-developed. She is not diaphoretic.   HENT:      Head: Normocephalic and atraumatic.      Nose: Nose normal.      Mouth/Throat:      Pharynx: No oropharyngeal exudate.   Eyes:      Conjunctiva/sclera: Conjunctivae normal.   Cardiovascular:      Rate and Rhythm: Normal rate and regular rhythm.      Heart sounds: Normal heart sounds. No murmur heard.     No friction rub.   Pulmonary:      Effort: Pulmonary effort is normal. No respiratory distress.      Breath sounds: Normal breath sounds. No stridor. No wheezing or rales.   Abdominal:      General: Bowel sounds are normal. There is no distension.      Palpations: Abdomen is soft. There is no mass.      Tenderness: There is no abdominal tenderness. There is no guarding.   Musculoskeletal:         General: No tenderness. Normal range of motion.      Cervical back: Normal range of motion and neck supple.   Skin:     General: Skin is warm and dry.      Capillary Refill: Capillary refill takes less than 2 seconds.      Findings: No erythema.   Neurological:      Mental Status: She is alert and oriented to person, place, and time.   Psychiatric:         Judgment: Judgment normal.         ED Course         Procedures        Results for orders placed or performed during the hospital encounter of 09/28/24 (from the past 24 hour(s))   CBC with platelets differential    Narrative    The following orders were created for panel order CBC with platelets differential.  Procedure                               Abnormality         Status                     ---------                               -----------         ------                     CBC with platelets and d...[[id number]]                      Final result                 Please view results for these tests on the individual orders.   Basic metabolic panel   Result Value Ref Range    Sodium 140 135 - 145 mmol/L    Potassium 3.7 3.4 - 5.3 mmol/L    Chloride 103 98 - 107 mmol/L    Carbon Dioxide (CO2) 25 22 - 29 mmol/L    Anion Gap 12 7 - 15 mmol/L    Urea Nitrogen 23.4 (H) 8.0 - 23.0 mg/dL    Creatinine 0.97 (H) 0.51 - 0.95 mg/dL    GFR Estimate 67 >60 mL/min/1.73m2    Calcium 9.7 8.8 - 10.4 mg/dL    Glucose 109 (H) 70 - 99 mg/dL   Troponin T, High Sensitivity   Result Value Ref Range    Troponin T, High Sensitivity 8 <=14 ng/L   Magnesium   Result Value Ref Range    Magnesium 1.8 1.7 - 2.3 mg/dL   CBC with platelets and differential   Result Value Ref Range    WBC Count 7.3 4.0 - 11.0 10e3/uL    RBC Count 4.87 3.80 - 5.20 10e6/uL    Hemoglobin 14.0 11.7 - 15.7 g/dL    Hematocrit 40.7 35.0 - 47.0 %    MCV 84 78 - 100 fL    MCH 28.7 26.5 - 33.0 pg    MCHC 34.4 31.5 - 36.5 g/dL    RDW 12.6 10.0 - 15.0 %    Platelet Count 304 150 - 450 10e3/uL    % Neutrophils 55 %    % Lymphocytes 33 %    % Monocytes 7 %    % Eosinophils 3 %    % Basophils 1 %    % Immature Granulocytes 1 %    NRBCs per 100 WBC 0 <1 /100    Absolute Neutrophils 4.0 1.6 - 8.3 10e3/uL    Absolute Lymphocytes 2.4 0.8 - 5.3 10e3/uL    Absolute Monocytes 0.5 0.0 - 1.3 10e3/uL    Absolute Eosinophils 0.2 0.0 - 0.7 10e3/uL    Absolute Basophils 0.1 0.0 - 0.2 10e3/uL    Absolute Immature Granulocytes 0.1 <=0.4  10e3/uL    Absolute NRBCs 0.0 10e3/uL   [facility] Draw    Narrative    The following orders were created for panel order [facility].  Procedure                               Abnormality         Status                     ---------                               -----------         ------                     Extra Blue Top Tube[[id number]]                              Final result                 Please view results for these tests on the individual orders.   Extra Blue Top Tube   Result Value Ref Range    Hold Specimen     CT Lumbar Spine w/o Contrast    Narrative    EXAM: CT LUMBAR SPINE W/O CONTRAST  LOCATION: [facility]  DATE: 9/29/2024    INDICATION: Bilateral leg numbness.    COMPARISON: None.    TECHNIQUE: Routine CT lumbar spine without IV contrast. Multiplanar reformats. Dose reduction techniques were used.     FINDINGS:  VERTEBRA: Five lumbar-type vertebral bodies. Slight right convexity lower lumbar curvature. Alignment is otherwise normal. Moderate disc space height T12-L1 and lower grade changes elsewhere. No acute lumbar spine fracture. Mild lower lumbar facet   arthropathy.    CANAL/FORAMINA: No canal or neural foraminal stenosis.    PARASPINAL: No extraspinal abnormality.      Impression    IMPRESSION: No acute lumbar spine fracture. No high grade canal or foraminal narrowing.   Head CT w/o contrast    Narrative    EXAM: CT HEAD W/O CONTRAST  LOCATION: [facility]  DATE: 9/29/2024    INDICATION: headaches, dizziness  COMPARISON: None.  TECHNIQUE: Routine CT Head without IV contrast. Multiplanar reformats. Dose reduction techniques were used.    FINDINGS:  INTRACRANIAL CONTENTS: No intracranial hemorrhage, extraaxial collection, or mass effect.  No CT evidence of acute infarct. Normal parenchymal attenuation. Normal ventricles and sulci.     VISUALIZED ORBITS/SINUSES/MASTOIDS: No intraorbital abnormality. No paranasal sinus mucosal disease.  No middle ear or mastoid effusion.    BONES/SOFT TISSUES: No acute abnormality.      Impression    IMPRESSION:  1.  Normal head CT.       Medications   LORazepam (ATIVAN) injection 1 mg (1 mg Intravenous $Given 9/29/24 0021)     Labs came back and were mostly unremarkable, electrolytes were normal, troponin was negative.  For her recent headaches and other vague symptoms I did do a scan of her head which was unremarkable, I was not suspecting stroke as patient had no documented weakness on exam and sensation was grossly intact, she just says they feel tingly.  I did do a CT scan of the lumbar spine looking for a big large obvious disc herniation or spinal stenosis but this was negative.  I know that this is not the most sensitive test but this is all we have available here on the weekend.  Patient was given some Ativan and she thinks this may be helped symptoms a bit.  I am not sure if her symptoms could be from some anxiety.  She is a smoker and could she be developing some neuropathy which would be possible.  At this point I think we ruled out any emergent medical condition, going to send her home with some more Ativan to see if that continues to help with her symptoms.  Recommend follow-up with her doctor later on this week if symptoms persist to consider further workup for neuropathy.  Patient was told to return if her symptoms do change or worsen, patient be discharged at this time.    Assessments & Plan (with Medical Decision Making)  Neuropathy, tension headache     I have reviewed the nursing notes.    I have reviewed the findings, diagnosis, plan and need for follow up with the patient.    9/28/2024   [facility] EMERGENCY DEPT       Anoop Spicer MD  09/29/24 0148

## 2024-09-29 NOTE — ED NOTES
"Did road test with patient. Walked her around department. Was able to walk with some SBA, mostly feeling \"drunk\" from the Ativan. Reports legs still feel a little weak but that overall she \"feels better\".  "

## 2024-10-16 ENCOUNTER — OFFICE VISIT (OUTPATIENT)
Dept: INTERNAL MEDICINE | Facility: CLINIC | Age: 59
End: 2024-10-16
Payer: COMMERCIAL

## 2024-10-16 ENCOUNTER — HOSPITAL ENCOUNTER (OUTPATIENT)
Dept: GENERAL RADIOLOGY | Facility: CLINIC | Age: 59
Discharge: HOME | End: 2024-10-16
Attending: INTERNAL MEDICINE | Admitting: INTERNAL MEDICINE
Payer: COMMERCIAL

## 2024-10-16 ENCOUNTER — TELEPHONE (OUTPATIENT)
Dept: INTERNAL MEDICINE | Facility: CLINIC | Age: 59
End: 2024-10-16

## 2024-10-16 VITALS
HEIGHT: 69 IN | DIASTOLIC BLOOD PRESSURE: 72 MMHG | HEART RATE: 86 BPM | RESPIRATION RATE: 16 BRPM | SYSTOLIC BLOOD PRESSURE: 120 MMHG | BODY MASS INDEX: 26.76 KG/M2 | OXYGEN SATURATION: 99 % | TEMPERATURE: 97.6 F | WEIGHT: 180.7 LBS

## 2024-10-16 DIAGNOSIS — G89.29 CHRONIC LEFT SHOULDER PAIN: ICD-10-CM

## 2024-10-16 DIAGNOSIS — M25.512 CHRONIC LEFT SHOULDER PAIN: ICD-10-CM

## 2024-10-16 DIAGNOSIS — M25.512 CHRONIC LEFT SHOULDER PAIN: Primary | ICD-10-CM

## 2024-10-16 DIAGNOSIS — G44.209 TENSION HEADACHE: ICD-10-CM

## 2024-10-16 DIAGNOSIS — G89.29 CHRONIC LEFT SHOULDER PAIN: Primary | ICD-10-CM

## 2024-10-16 PROCEDURE — 73030 X-RAY EXAM OF SHOULDER: CPT | Mod: LT

## 2024-10-16 PROCEDURE — 99213 OFFICE O/P EST LOW 20 MIN: CPT | Performed by: INTERNAL MEDICINE

## 2024-10-16 PROCEDURE — G2211 COMPLEX E/M VISIT ADD ON: HCPCS | Performed by: INTERNAL MEDICINE

## 2024-10-16 ASSESSMENT — PAIN SCALES - GENERAL: PAINLEVEL: SEVERE PAIN (6)

## 2024-10-16 NOTE — TELEPHONE ENCOUNTER
Forms/Letter Request    Type of form/letter: OTHER: [employer] for [employer]       Do we have the form/letter: Yes:    Who is the form from? Patient    Where did/will the form come from? Patient or family brought in       When is form/letter needed by: ASAP    How would you like the form/letter returned:     Patient Notified form requests are processed in 5-7 business days:Yes    Could we send this information to you in [facility] or would you prefer to receive a phone call?:   Patient would prefer a phone call   Okay to leave a detailed message?: Yes at Cell number on file:    Telephone Information:   Mobile [phone]

## 2024-10-16 NOTE — PROGRESS NOTES
Assessment & Plan   Problem List Items Addressed This Visit    None  Visit Diagnoses       Chronic left shoulder pain    -  Primary    Relevant Orders    Physical Therapy  Referral    Tension headache               Patient has chronic left shoulder pain.  This is worse with internal rotation involves the long head of the bicep believe this is arthritis we will do an x-ray today.  Have her rest it, try to lift less than 10 pounds keep working on range of motion, see physical therapy use ibuprofen 600 mg 3 times a day for the next week.    Long-term offered her orthopedic consultation and possibly an injection.  Even longer-term discussed her job and she may need to go to a less physical job which she will consider.    Think of her shoulder gets better this will take the tension of her trapezius going up into her head and chronic headaches.  Do not believe she had any  sinus problems and does not need to be on Sudafed.         MED REC REQUIRED  Post Medication Reconciliation Status: discharge medications reconciled and changed, per note/orders    The longitudinal plan of care for the diagnosis(es)/condition(s) as documented were addressed during this visit. Due to the added complexity in care, I will continue to support Richmond in the subsequent management and with ongoing continuity of care.    Darron Fragoso is a 59 year old, presenting for the following health issues:  ER F/U      10/16/2024     9:11 AM   Additional Questions   Roomed by Xuan     [facility]     ED/UC Followup:    Facility: [facility] Emergency Dept   Date of visit: 09/28/2024  Reason for visit: Dizziness, Numbness in legs and arms  Current Status: Symptoms have improved       Pressure headache for a month, wonders about her sinuses, went to urgent care. Ear had some fluid, given doxycycline and took for a week, no relief.     Then here for shoulder and tension giving her headache, given muscle relaxant and then legs and  "arm got numb and ended up in the ER.     ER gave her ativan hasn't needed any.      Numbness in arms and legs is gone when not on the medications. Normal head ct, normal lumbar ct, normal labs.   Legs and arms are back to normal.      Left shoulder hurts, pops and cracks, physical job at the [employer].        Objective    /72 (BP Location: Right arm, Patient Position: Sitting, Cuff Size: Adult Regular)   Pulse 86   Temp 97.6  F (36.4  C) (Temporal)   Resp 16   Ht 1.74 m (5' 8.5\")   Wt 82 kg (180 lb 11.2 oz)   LMP 05/12/2014 (Approximate)   SpO2 99%   BMI 27.07 kg/m    Body mass index is 27.07 kg/m .  Physical Exam   NAD  Left shoulder has pain with internal rotation over long head of the biceps,   Pain in the trapezius and tightness.          Signed Electronically by: Anjum Ross MD  "

## 2024-11-11 ENCOUNTER — THERAPY VISIT (OUTPATIENT)
Dept: PHYSICAL THERAPY | Facility: CLINIC | Age: 59
End: 2024-11-11
Attending: INTERNAL MEDICINE
Payer: OTHER MISCELLANEOUS

## 2024-11-11 DIAGNOSIS — G89.29 CHRONIC LEFT SHOULDER PAIN: ICD-10-CM

## 2024-11-11 DIAGNOSIS — M25.512 CHRONIC LEFT SHOULDER PAIN: ICD-10-CM

## 2024-11-11 PROCEDURE — 97110 THERAPEUTIC EXERCISES: CPT | Mod: GP

## 2024-11-11 PROCEDURE — 97161 PT EVAL LOW COMPLEX 20 MIN: CPT | Mod: GP

## 2024-11-11 ASSESSMENT — ACTIVITIES OF DAILY LIVING (ADL)
PUTTING_ON_YOUR_PANTS: 3
PUSHING_WITH_THE_INVOLVED_ARM: 5
PLEASE_INDICATE_YOR_PRIMARY_REASON_FOR_REFERRAL_TO_THERAPY:: SHOULDER
WASHING_YOUR_HAIR?: 0
WHEN_LYING_ON_THE_INVOLVED_SIDE: 8
PUTTING_ON_AN_UNDERSHIRT_OR_A_PULLOVER_SWEATER: 7
PUTTING_ON_A_SHIRT_THAT_BUTTONS_DOWN_THE_FRONT: 2
REACHING_FOR_SOMETHING_ON_A_HIGH_SHELF: 7
TOUCHING_THE_BACK_OF_YOUR_NECK: 6
AT_ITS_WORST?: 5
CARRYING_A_HEAVY_OBJECT_OF_10_POUNDS: 3
WASHING_YOUR_BACK: 3
REMOVING_SOMETHING_FROM_YOUR_BACK_POCKET: 3
PLACING_AN_OBJECT_ON_A_HIGH_SHELF: 7

## 2024-11-11 NOTE — PROGRESS NOTES
PHYSICAL THERAPY EVALUATION  Type of Visit: Evaluation       Fall Risk Screen:  Fall screen completed by: PT  Have you fallen 2 or more times in the past year?: (Patient-Rptd) No  Have you fallen and had an injury in the past year?: (Patient-Rptd) No  Is patient a fall risk?: No    Subjective         Presenting condition or subjective complaint: (Patient-Rptd) shoulder painPt reports history of shoulder pain. Thinks it might have started from work but mostly noticed a headache for about a month. Was found to have a sinus infection. Then at a follow up visit was found to have a tight trap and was provided muscle relaxors and claratin. Was in the ED due to arms and legs going numb. Has been seeing a massage therapist which helps. Is supposed to be on a 10lb lifting restriction but still lifting repeatedly. Mostly states that pain is in her upper trap and going up towards her head causing headaches.  Date of onset: 10/16/24 (date of order)    Relevant medical history: (Patient-Rptd) Bladder or bowel problems   Dates & types of surgery:      Prior diagnostic imaging/testing results: (Patient-Rptd) CT scan   unremarkable x ray  Prior therapy history for the same diagnosis, illness or injury: (Patient-Rptd) No      Prior Level of Function  Transfers: Independent  Ambulation: Independent  ADL: Independent    Living Environment  Social support: (Patient-Rptd) Alone   Type of home: (Patient-Rptd) House; 1 level   Stairs to enter the home: (Patient-Rptd) Yes (Patient-Rptd) 3 Is there a railing: (Patient-Rptd) Yes     Ramp: (Patient-Rptd) No   Stairs inside the home: (Patient-Rptd) No       Help at home: (Patient-Rptd) None  Equipment owned:       Employment:      Hobbies/Interests:      Patient goals for therapy: (Patient-Rptd) my job    Pain assessment: See objective evaluation for additional pain details     Objective   CERVICAL SPINE EVALUATION  PAIN: Pain Level at Rest: 0/10  Pain Level with Use: 5/10  Pain Location:  upper trap on the L   Pain is Exacerbated By: repeated lifting at work and internal rotation  Pain is Relieved By: NSAIDs and rest  INTEGUMENTARY (edema, incisions): WNL  POSTURE:  forward head posture and rounded shoulders, increased thoracic kyphosis  ROM:  Shoulder: Flexion 98 deg where pain starts and some hand numbness, abduction 130 deg less pain, ER 80 deg, IR/ext T10. Neck: limited SB B to 50%, slightly worse on R SB, limited upper thoracic extension   Shoulder strength: flexion 4-/5, abduction 4-/5, others 4/5  MYOTOMES: WNL  CORD SIGNS: WNL  DERMATOMES: WNL  FLEXIBILITY:  decreased flexibility of UT, lev scap, and upper paraspinals    SPECIAL TESTS:  negative quadrant testing, negative neers, negative hawkkins shailesh  PALPATION:  tenderness throughout L UT, L lev scap  SPINAL SEGMENTAL CONCLUSIONS:  decreased spinal mobility through lower cervical and upper thoracic spine    Assessment & Plan   CLINICAL IMPRESSIONS  Medical Diagnosis: Chronic left shoulder pain (M25.512, G89.29)  - Primary    Treatment Diagnosis: L shoulder pain w/weakness, decreased ROM, decreased tissue extensibility, and decreased activity tolerance   Impression/Assessment: Patient is a 59 year old female with L shoulder/neck complaints.  The following significant findings have been identified: Pain, Decreased ROM/flexibility, Decreased joint mobility, Decreased strength, Impaired muscle performance, Decreased activity tolerance, and Impaired posture. These impairments interfere with their ability to perform work tasks, recreational activities, and household chores as compared to previous level of function. Pt will benefit from skilled PT interventions to address the above listed impairments to reduce pain and improve overall function and activity tolerance.     Clinical Decision Making (Complexity):  Clinical Presentation: Stable/Uncomplicated  Clinical Presentation Rationale: based on medical and personal factors listed in PT  evaluation  Clinical Decision Making (Complexity): Low complexity    PLAN OF CARE  Treatment Interventions:  Modalities: Cryotherapy, E-stim, Hot Pack, Ultrasound, as needed  Interventions: Manual Therapy, Neuromuscular Re-education, Therapeutic Activity, Therapeutic Exercise, as needed    Long Term Goals     PT Goal 1  Goal Identifier: HEP  Goal Description: Pt will demonstrate accurate compliance to HEP >4 days per wk to independently progress function and reduce pain  Target Date: 01/20/25  PT Goal 2  Goal Identifier: Work  Goal Description: Pt will tolerate a full day of work w/o increased shoulder/neck pain to be able to work  Target Date: 02/03/25  PT Goal 3  Goal Identifier: Posture  Goal Description: Pt will maintain neutral posture in sitting or standing for >5 min to reduce stress on neck and shoulder  Target Date: 02/03/25  PT Goal 4  Goal Identifier: Headache  Goal Description: Pt will report minimal to no headaches on 4/7 days per wk to show reducing tension on head and neck  Target Date: 02/03/25      Frequency of Treatment: 1x per wk  Duration of Treatment: 12 wks    Recommended Referrals to Other Professionals:  n/a  Education Assessment:   Learner/Method: Patient;Listening;Reading;Demonstration;Pictures/Video;No Barriers to Learning    Risks and benefits of evaluation/treatment have been explained.   Patient/Family/caregiver agrees with Plan of Care.     Evaluation Time:     PT Eval, Low Complexity Minutes (07814): 35     Signing Clinician: Heather Carter PT

## 2024-12-03 ENCOUNTER — MYC MEDICAL ADVICE (OUTPATIENT)
Dept: INTERNAL MEDICINE | Facility: CLINIC | Age: 59
End: 2024-12-03
Payer: COMMERCIAL

## 2024-12-04 ENCOUNTER — NURSE TRIAGE (OUTPATIENT)
Dept: INTERNAL MEDICINE | Facility: CLINIC | Age: 59
End: 2024-12-04

## 2024-12-04 NOTE — TELEPHONE ENCOUNTER
Patient returned call, relayed provider message below okay to wait for appointment Friday.     Aminta Leiva, RN, BSN

## 2024-12-04 NOTE — TELEPHONE ENCOUNTER
RN TRIAGE CALL:    Patient Contact    Attempt # 1    Was call answered?  No.  Left message on voicemail with information to call me back.    OSMANI NewtonN, RN

## 2024-12-04 NOTE — TELEPHONE ENCOUNTER
Patient has UTI symptoms and is willing to see anyone who could possibly fit her in on Friday. I offered Virtual at other locations but she wasn't wanting a virtual apt.

## 2024-12-04 NOTE — TELEPHONE ENCOUNTER
"Nurse Triage SBAR    Is this a 2nd Level Triage? YES, LICENSED PRACTITIONER REVIEW IS REQUIRED    Situation: Patient requesting appointment for UTI symptoms, offered virtual appointment but patient declined.     Nurse triaged. Patient reporting foul smelling urine that is cloud in appearance. No blood. No fevers. Patient does report some back ache but she is not sure if that is from work or if that symptoms started with urinary symptoms.     Background:     Assessment: A/O.    Protocol Recommended Disposition:   See in Office Today    Recommendation: Per protocol, see today. No appointments available. Advised to go to [facility] for treatment. Patient declines this disposition. Patient asking for appointment for Friday. Advised patient we'd like her to be seen sooner than Friday. Patient states she is unable to come in tomorrow. Offered virtual visit again, pt declined. Appointment made for Friday per patient request. Advised patient to seek [facility] d/t symptoms of infection. Patient states \"we'll see how I'm feeling. Just keep my appointment for Friday just in case\".     Routing to provider to review. Okay to wait until Friday?    Routed to provider    Does the patient meet one of the following criteria for ADS visit consideration? No    Sydney Howard RN on 12/4/2024 at 5:03 PM      Reason for Disposition   Bad or foul-smelling urine   Side (flank) or lower back pain present    Additional Information   Negative: Shock suspected (e.g., cold/pale/clammy skin, too weak to stand, low BP, rapid pulse)   Negative: Sounds like a life-threatening emergency to the triager   Negative: Followed a female genital area injury (e.g., labia, vagina, vulva)   Negative: Followed a male genital area injury (penis, scrotum)   Negative: Vaginal discharge   Negative: Pus (white, yellow) or bloody discharge from end of penis   Negative: Pain or burning with passing urine (urination) and pregnant   Negative: Pain or burning with " passing urine (urination) and female   Negative: Pain or burning with passing urine (urination) and male   Negative: Pain or itching in the vulvar area   Negative: Pain in scrotum is main symptom   Negative: Blood in the urine is main symptom   Negative: Symptoms arising from use of a urinary catheter (e.g., coude, Newman)   Negative: Unable to urinate (or only a few drops) > 4 hours and bladder feels very full (e.g., palpable bladder or strong urge to urinate)   Negative: Decreased urination and drinking very little and dehydration suspected (e.g., dark urine, no urine > 12 hours, very dry mouth, very lightheaded)   Negative: Patient sounds very sick or weak to the triager   Negative: Fever > 100.4 F  (38.0 C)    Protocols used: Urinary Symptoms-A-[place]

## 2024-12-05 ENCOUNTER — THERAPY VISIT (OUTPATIENT)
Dept: PHYSICAL THERAPY | Facility: CLINIC | Age: 59
End: 2024-12-05
Attending: INTERNAL MEDICINE
Payer: OTHER MISCELLANEOUS

## 2024-12-05 DIAGNOSIS — M25.512 CHRONIC LEFT SHOULDER PAIN: Primary | ICD-10-CM

## 2024-12-05 DIAGNOSIS — G89.29 CHRONIC LEFT SHOULDER PAIN: Primary | ICD-10-CM

## 2024-12-05 PROCEDURE — 97140 MANUAL THERAPY 1/> REGIONS: CPT | Mod: GP

## 2024-12-05 PROCEDURE — 97110 THERAPEUTIC EXERCISES: CPT | Mod: GP

## 2024-12-26 ENCOUNTER — THERAPY VISIT (OUTPATIENT)
Dept: PHYSICAL THERAPY | Facility: CLINIC | Age: 59
End: 2024-12-26
Attending: INTERNAL MEDICINE
Payer: OTHER MISCELLANEOUS

## 2024-12-26 DIAGNOSIS — M25.512 CHRONIC LEFT SHOULDER PAIN: Primary | ICD-10-CM

## 2024-12-26 DIAGNOSIS — G89.29 CHRONIC LEFT SHOULDER PAIN: Primary | ICD-10-CM

## 2024-12-26 PROCEDURE — 999N000104 HC STATISTIC NO CHARGE

## 2025-01-29 ENCOUNTER — TELEPHONE (OUTPATIENT)
Dept: INTERNAL MEDICINE | Facility: CLINIC | Age: 60
End: 2025-01-29
Payer: COMMERCIAL

## 2025-01-29 NOTE — TELEPHONE ENCOUNTER
Provider (Dr Ross) received a fax (in HOLD bin at [employer] desk)  from [employer] requesting a letter regarding her left shoulder condition.    Dr Ross will need an OKSANA signed by the patient before he can give out any information.    Dr Ross also said that the appointment the patient had was not a work comp visit and there is no support for work injury.    Message left for patient to call back.    Gala Mccormack XRO/

## 2025-01-30 NOTE — TELEPHONE ENCOUNTER
Attempted contacting patient, no answer, left a message asking for a return call. See Ke's message below.     Yola Pruitt, VF

## 2025-01-31 NOTE — TELEPHONE ENCOUNTER
Attempted contacting patient, no answer, left a message asking for a return call.    Yola Pruitt, VF

## 2025-02-03 ENCOUNTER — MYC MEDICAL ADVICE (OUTPATIENT)
Dept: FAMILY MEDICINE | Facility: CLINIC | Age: 60
End: 2025-02-03
Payer: COMMERCIAL

## 2025-02-05 NOTE — TELEPHONE ENCOUNTER
Patient called back, relayed messages below. Patient had further questions regarding visit not being workman's comp. Encouraged patient to reach out via [employer] so she can get further clarification from provider.     Holden Jacobson, CNA to [place] [facility]      2/3/25 12:37 PM  Aneudy Fragoso,      We at [facility] have tried to contact you via phone a few times.      [employer] [facility] requesting a letter regarding her left shoulder condition. In order to fulfill this request we will need you to sign a OKSANA before your provider can give out any information on your behalf.      Also per your provider Dr Ross also said that the appointment the patient had was not a work comp visit and there is no support for work injury.      Thank you   [facility]     This [employer] message has not been read.      Aminta Leiva, RN, BSN

## 2025-06-09 ENCOUNTER — PATIENT OUTREACH (OUTPATIENT)
Dept: CARE COORDINATION | Facility: CLINIC | Age: 60
End: 2025-06-09
Payer: COMMERCIAL

## 2025-06-23 ENCOUNTER — PATIENT OUTREACH (OUTPATIENT)
Dept: CARE COORDINATION | Facility: CLINIC | Age: 60
End: 2025-06-23
Payer: COMMERCIAL

## 2025-06-24 ENCOUNTER — PATIENT OUTREACH (OUTPATIENT)
Dept: FAMILY MEDICINE | Facility: CLINIC | Age: 60
End: 2025-06-24
Payer: COMMERCIAL

## 2025-06-24 PROBLEM — R87.810 CERVICAL HIGH RISK HPV (HUMAN PAPILLOMAVIRUS) TEST POSITIVE: Status: ACTIVE | Noted: 2024-07-08

## 2025-06-30 ENCOUNTER — MYC REFILL (OUTPATIENT)
Dept: INTERNAL MEDICINE | Facility: CLINIC | Age: 60
End: 2025-06-30
Payer: COMMERCIAL

## 2025-06-30 DIAGNOSIS — F51.01 PRIMARY INSOMNIA: ICD-10-CM

## 2025-06-30 RX ORDER — TRAZODONE HYDROCHLORIDE 50 MG/1
50 TABLET ORAL AT BEDTIME
Qty: 90 TABLET | Refills: 0 | Status: SHIPPED | OUTPATIENT
Start: 2025-06-30

## 2025-08-16 ENCOUNTER — HEALTH MAINTENANCE LETTER (OUTPATIENT)
Age: 60
End: 2025-08-16

## (undated) RX ORDER — PROPOFOL 10 MG/ML
INJECTION, EMULSION INTRAVENOUS
Status: DISPENSED
Start: 2021-04-08

## (undated) RX ORDER — LIDOCAINE HYDROCHLORIDE 20 MG/ML
INJECTION, SOLUTION EPIDURAL; INFILTRATION; INTRACAUDAL; PERINEURAL
Status: DISPENSED
Start: 2021-04-08